# Patient Record
Sex: FEMALE | Race: WHITE | Employment: FULL TIME | URBAN - METROPOLITAN AREA
[De-identification: names, ages, dates, MRNs, and addresses within clinical notes are randomized per-mention and may not be internally consistent; named-entity substitution may affect disease eponyms.]

---

## 2019-08-21 ENCOUNTER — TELEPHONE (OUTPATIENT)
Dept: INTERNAL MEDICINE CLINIC | Facility: CLINIC | Age: 39
End: 2019-08-21

## 2019-08-21 NOTE — TELEPHONE ENCOUNTER
Phone call transferred from . Patient's  Missy Hathaway is on the phone. Per  he is trying to move up patient's appt. She is to be a new patient for Dr Jozef Quintero. She has an appt next week. Missy Rivas tells me he is worried about his wife.  She tabor

## 2019-08-29 ENCOUNTER — OFFICE VISIT (OUTPATIENT)
Dept: INTERNAL MEDICINE CLINIC | Facility: CLINIC | Age: 39
End: 2019-08-29
Payer: COMMERCIAL

## 2019-08-29 VITALS
HEART RATE: 76 BPM | DIASTOLIC BLOOD PRESSURE: 96 MMHG | TEMPERATURE: 99 F | OXYGEN SATURATION: 98 % | SYSTOLIC BLOOD PRESSURE: 160 MMHG | HEIGHT: 62 IN | BODY MASS INDEX: 31.1 KG/M2 | WEIGHT: 169 LBS

## 2019-08-29 DIAGNOSIS — M35.00 SJOGREN'S SYNDROME, WITH UNSPECIFIED ORGAN INVOLVEMENT (HCC): ICD-10-CM

## 2019-08-29 DIAGNOSIS — Z12.39 SCREENING FOR BREAST CANCER: ICD-10-CM

## 2019-08-29 DIAGNOSIS — I10 ESSENTIAL HYPERTENSION: ICD-10-CM

## 2019-08-29 DIAGNOSIS — Z00.00 ANNUAL PHYSICAL EXAM: Primary | ICD-10-CM

## 2019-08-29 PROCEDURE — 90471 IMMUNIZATION ADMIN: CPT | Performed by: INTERNAL MEDICINE

## 2019-08-29 PROCEDURE — 99385 PREV VISIT NEW AGE 18-39: CPT | Performed by: INTERNAL MEDICINE

## 2019-08-29 PROCEDURE — 90715 TDAP VACCINE 7 YRS/> IM: CPT | Performed by: INTERNAL MEDICINE

## 2019-08-29 RX ORDER — AMLODIPINE BESYLATE 2.5 MG/1
2.5 TABLET ORAL DAILY
Qty: 90 TABLET | Refills: 1 | Status: SHIPPED | OUTPATIENT
Start: 2019-08-29 | End: 2020-03-02

## 2019-08-29 NOTE — PROGRESS NOTES
Codi Mata is a 44year old female. Patient presents with:  Establish Care: Dx with sjrogen in 2004 and has not had Tx. hx bell's palsy.  she has not had a PCP in many years  Physical: last pap 2017 normal at [facility] per pt      HPI:   Codi Mata is a 44 year wheezing  BREAST: denies masses or nipple discharge  CARDIOVASCULAR: denies chest pain, pressure, or palpitations  GI: denies abdominal pain, nausea, vomiting, diarrhea, constipation, hematochezia, or melena  : denies dysuria, urinary frequency, vaginal

## 2019-08-31 ENCOUNTER — LAB ENCOUNTER (OUTPATIENT)
Dept: LAB | Age: 39
End: 2019-08-31
Attending: INTERNAL MEDICINE
Payer: COMMERCIAL

## 2019-08-31 DIAGNOSIS — Z00.00 ANNUAL PHYSICAL EXAM: ICD-10-CM

## 2019-08-31 LAB
ALBUMIN SERPL-MCNC: 3.8 G/DL (ref 3.4–5)
ALBUMIN/GLOB SERPL: 0.8 {RATIO} (ref 1–2)
ALP LIVER SERPL-CCNC: 91 U/L (ref 37–98)
ALT SERPL-CCNC: 21 U/L (ref 13–56)
ANION GAP SERPL CALC-SCNC: 7 MMOL/L (ref 0–18)
AST SERPL-CCNC: 15 U/L (ref 15–37)
BASOPHILS # BLD AUTO: 0.03 X10(3) UL (ref 0–0.2)
BASOPHILS NFR BLD AUTO: 0.5 %
BILIRUB SERPL-MCNC: 0.5 MG/DL (ref 0.1–2)
BUN BLD-MCNC: 13 MG/DL (ref 7–18)
BUN/CREAT SERPL: 21.3 (ref 10–20)
CALCIUM BLD-MCNC: 8.7 MG/DL (ref 8.5–10.1)
CHLORIDE SERPL-SCNC: 107 MMOL/L (ref 98–112)
CHOLEST SMN-MCNC: 113 MG/DL (ref ?–200)
CO2 SERPL-SCNC: 24 MMOL/L (ref 21–32)
CREAT BLD-MCNC: 0.61 MG/DL (ref 0.55–1.02)
DEPRECATED RDW RBC AUTO: 37.4 FL (ref 35.1–46.3)
EOSINOPHIL # BLD AUTO: 0.1 X10(3) UL (ref 0–0.7)
EOSINOPHIL NFR BLD AUTO: 1.8 %
ERYTHROCYTE [DISTWIDTH] IN BLOOD BY AUTOMATED COUNT: 11.8 % (ref 11–15)
GLOBULIN PLAS-MCNC: 4.5 G/DL (ref 2.8–4.4)
GLUCOSE BLD-MCNC: 89 MG/DL (ref 70–99)
HCT VFR BLD AUTO: 38 % (ref 35–48)
HDLC SERPL-MCNC: 33 MG/DL (ref 40–59)
HGB BLD-MCNC: 13 G/DL (ref 12–16)
IMM GRANULOCYTES # BLD AUTO: 0.01 X10(3) UL (ref 0–1)
IMM GRANULOCYTES NFR BLD: 0.2 %
LDLC SERPL CALC-MCNC: 66 MG/DL (ref ?–100)
LYMPHOCYTES # BLD AUTO: 1.8 X10(3) UL (ref 1–4)
LYMPHOCYTES NFR BLD AUTO: 32.3 %
M PROTEIN MFR SERPL ELPH: 8.3 G/DL (ref 6.4–8.2)
MCH RBC QN AUTO: 29.6 PG (ref 26–34)
MCHC RBC AUTO-ENTMCNC: 34.2 G/DL (ref 31–37)
MCV RBC AUTO: 86.6 FL (ref 80–100)
MONOCYTES # BLD AUTO: 0.5 X10(3) UL (ref 0.1–1)
MONOCYTES NFR BLD AUTO: 9 %
NEUTROPHILS # BLD AUTO: 3.13 X10 (3) UL (ref 1.5–7.7)
NEUTROPHILS # BLD AUTO: 3.13 X10(3) UL (ref 1.5–7.7)
NEUTROPHILS NFR BLD AUTO: 56.2 %
NONHDLC SERPL-MCNC: 80 MG/DL (ref ?–130)
OSMOLALITY SERPL CALC.SUM OF ELEC: 286 MOSM/KG (ref 275–295)
PATIENT FASTING: YES
PATIENT FASTING: YES
PLATELET # BLD AUTO: 301 10(3)UL (ref 150–450)
POTASSIUM SERPL-SCNC: 3.8 MMOL/L (ref 3.5–5.1)
RBC # BLD AUTO: 4.39 X10(6)UL (ref 3.8–5.3)
SODIUM SERPL-SCNC: 138 MMOL/L (ref 136–145)
TRIGL SERPL-MCNC: 72 MG/DL (ref 30–149)
TSI SER-ACNC: 1.82 MIU/ML (ref 0.36–3.74)
VLDLC SERPL CALC-MCNC: 14 MG/DL (ref 0–30)
WBC # BLD AUTO: 5.6 X10(3) UL (ref 4–11)

## 2019-08-31 PROCEDURE — 80053 COMPREHEN METABOLIC PANEL: CPT

## 2019-08-31 PROCEDURE — 80061 LIPID PANEL: CPT

## 2019-08-31 PROCEDURE — 84443 ASSAY THYROID STIM HORMONE: CPT

## 2019-08-31 PROCEDURE — 36415 COLL VENOUS BLD VENIPUNCTURE: CPT

## 2019-08-31 PROCEDURE — 85025 COMPLETE CBC W/AUTO DIFF WBC: CPT

## 2019-09-02 ENCOUNTER — TELEPHONE (OUTPATIENT)
Dept: INTERNAL MEDICINE CLINIC | Facility: CLINIC | Age: 39
End: 2019-09-02

## 2019-09-02 DIAGNOSIS — R77.1 ELEVATED SERUM GLOBULIN LEVEL: Primary | ICD-10-CM

## 2019-09-02 NOTE — TELEPHONE ENCOUNTER
Please notify patient that her labs were withiin the normal range in regards to electrolytes, kidney function liver function, cholesterol, fasting sugar.    One of her protein levels was elevated and before we get worried about this, I would like her to rep

## 2019-09-03 ENCOUNTER — APPOINTMENT (OUTPATIENT)
Dept: LAB | Age: 39
End: 2019-09-03
Attending: INTERNAL MEDICINE
Payer: COMMERCIAL

## 2019-09-03 DIAGNOSIS — R77.1 ELEVATED SERUM GLOBULIN LEVEL: ICD-10-CM

## 2019-09-03 LAB
ALBUMIN SERPL-MCNC: 3.7 G/DL (ref 3.4–5)
ALBUMIN/GLOB SERPL: 0.8 {RATIO} (ref 1–2)
ALP LIVER SERPL-CCNC: 90 U/L (ref 37–98)
ALT SERPL-CCNC: 18 U/L (ref 13–56)
ANION GAP SERPL CALC-SCNC: 4 MMOL/L (ref 0–18)
AST SERPL-CCNC: 13 U/L (ref 15–37)
BILIRUB SERPL-MCNC: 0.3 MG/DL (ref 0.1–2)
BUN BLD-MCNC: 13 MG/DL (ref 7–18)
BUN/CREAT SERPL: 23.2 (ref 10–20)
CALCIUM BLD-MCNC: 8.4 MG/DL (ref 8.5–10.1)
CHLORIDE SERPL-SCNC: 107 MMOL/L (ref 98–112)
CO2 SERPL-SCNC: 27 MMOL/L (ref 21–32)
CREAT BLD-MCNC: 0.56 MG/DL (ref 0.55–1.02)
GLOBULIN PLAS-MCNC: 4.7 G/DL (ref 2.8–4.4)
GLUCOSE BLD-MCNC: 91 MG/DL (ref 70–99)
M PROTEIN MFR SERPL ELPH: 8.4 G/DL (ref 6.4–8.2)
OSMOLALITY SERPL CALC.SUM OF ELEC: 286 MOSM/KG (ref 275–295)
PATIENT FASTING: NO
POTASSIUM SERPL-SCNC: 3.6 MMOL/L (ref 3.5–5.1)
SODIUM SERPL-SCNC: 138 MMOL/L (ref 136–145)

## 2019-09-03 PROCEDURE — 36415 COLL VENOUS BLD VENIPUNCTURE: CPT

## 2019-09-03 PROCEDURE — 80053 COMPREHEN METABOLIC PANEL: CPT

## 2019-09-13 ENCOUNTER — TELEPHONE (OUTPATIENT)
Dept: INTERNAL MEDICINE CLINIC | Facility: CLINIC | Age: 39
End: 2019-09-13

## 2019-09-16 ENCOUNTER — LAB ENCOUNTER (OUTPATIENT)
Dept: LAB | Age: 39
End: 2019-09-16
Attending: INTERNAL MEDICINE
Payer: COMMERCIAL

## 2019-09-16 ENCOUNTER — TELEPHONE (OUTPATIENT)
Dept: INTERNAL MEDICINE CLINIC | Facility: CLINIC | Age: 39
End: 2019-09-16

## 2019-09-16 DIAGNOSIS — D89.2 HYPERGAMMAGLOBULINEMIA: Primary | ICD-10-CM

## 2019-09-16 DIAGNOSIS — D89.2 HYPERGAMMAGLOBULINEMIA: ICD-10-CM

## 2019-09-16 LAB
BILIRUB UR QL: NEGATIVE
CLARITY UR: CLEAR
COLOR UR: YELLOW
GLUCOSE UR-MCNC: NEGATIVE MG/DL
HGB UR QL STRIP.AUTO: NEGATIVE
KETONES UR-MCNC: NEGATIVE MG/DL
LEUKOCYTE ESTERASE UR QL STRIP.AUTO: NEGATIVE
NITRITE UR QL STRIP.AUTO: NEGATIVE
PH UR: 6 [PH] (ref 5–8)
PROT UR-MCNC: NEGATIVE MG/DL
SP GR UR STRIP: 1.02 (ref 1–1.03)
UROBILINOGEN UR STRIP-ACNC: <2

## 2019-09-16 PROCEDURE — 86039 ANTINUCLEAR ANTIBODIES (ANA): CPT

## 2019-09-16 PROCEDURE — 86038 ANTINUCLEAR ANTIBODIES: CPT

## 2019-09-16 PROCEDURE — 84165 PROTEIN E-PHORESIS SERUM: CPT

## 2019-09-16 PROCEDURE — 86334 IMMUNOFIX E-PHORESIS SERUM: CPT

## 2019-09-16 PROCEDURE — 83883 ASSAY NEPHELOMETRY NOT SPEC: CPT

## 2019-09-16 PROCEDURE — 81003 URINALYSIS AUTO W/O SCOPE: CPT

## 2019-09-16 PROCEDURE — 82595 ASSAY OF CRYOGLOBULIN: CPT

## 2019-09-16 PROCEDURE — 86335 IMMUNFIX E-PHORSIS/URINE/CSF: CPT

## 2019-09-16 PROCEDURE — 84156 ASSAY OF PROTEIN URINE: CPT

## 2019-09-16 PROCEDURE — 36415 COLL VENOUS BLD VENIPUNCTURE: CPT

## 2019-09-16 NOTE — TELEPHONE ENCOUNTER
Please call patient; her protein levels are still slightly elevated. I would recommend doing additional testing to evaluate this further.  I have placed orders; she does not need to fast.     This elevated protein may not be of significance but as it is sli

## 2019-09-19 LAB — NUCLEAR IGG TITR SER IF: POSITIVE {TITER}

## 2019-09-20 LAB
ALBUMIN SERPL ELPH-MCNC: 3.99 G/DL (ref 3.75–5.21)
ALBUMIN/GLOB SERPL: 1.05 {RATIO} (ref 1–2)
ALPHA1 GLOB SERPL ELPH-MCNC: 0.26 G/DL (ref 0.19–0.46)
ALPHA2 GLOB SERPL ELPH-MCNC: 0.58 G/DL (ref 0.48–1.05)
ANA NUCLEOLAR TITR SER IF: 640 {TITER}
B-GLOBULIN SERPL ELPH-MCNC: 0.9 G/DL (ref 0.68–1.23)
GAMMA GLOB SERPL ELPH-MCNC: 2.07 G/DL (ref 0.62–1.7)
TOTAL PROTEIN (SPECIAL TESTING): 7.8 G/DL (ref 6.5–9.1)

## 2019-09-25 ENCOUNTER — TELEPHONE (OUTPATIENT)
Dept: INTERNAL MEDICINE CLINIC | Facility: CLINIC | Age: 39
End: 2019-09-25

## 2019-09-25 NOTE — TELEPHONE ENCOUNTER
Patient says she would like Dr. Sid Prater recommendation for a rheumatologist and adds that she has [employer]. I recommended she call Ayad to see who is in her network so referral is covered.  She will call back after she speaks with her insurance

## 2019-10-07 ENCOUNTER — PATIENT MESSAGE (OUTPATIENT)
Dept: INTERNAL MEDICINE CLINIC | Facility: CLINIC | Age: 39
End: 2019-10-07

## 2019-10-07 DIAGNOSIS — M35.00 SJOGREN'S SYNDROME, WITH UNSPECIFIED ORGAN INVOLVEMENT (HCC): Primary | ICD-10-CM

## 2019-10-07 NOTE — TELEPHONE ENCOUNTER
From: Darl Lesch  To: Bobby Obrien DO  Sent: 10/7/2019 11:42 AM CDT  Subject: Referral Request    Hello, I did call to schedule an appoinment with Dr. Eduardo Truong as instructed, however she is on Maternity Leave, so I was scheduled to see Dr Sandi Verma this Olinda Jorge

## 2019-10-09 ENCOUNTER — OFFICE VISIT (OUTPATIENT)
Dept: RHEUMATOLOGY | Facility: CLINIC | Age: 39
End: 2019-10-09
Payer: COMMERCIAL

## 2019-10-09 ENCOUNTER — APPOINTMENT (OUTPATIENT)
Dept: LAB | Facility: HOSPITAL | Age: 39
End: 2019-10-09
Attending: INTERNAL MEDICINE
Payer: COMMERCIAL

## 2019-10-09 VITALS
SYSTOLIC BLOOD PRESSURE: 131 MMHG | HEIGHT: 62 IN | DIASTOLIC BLOOD PRESSURE: 84 MMHG | BODY MASS INDEX: 30.55 KG/M2 | HEART RATE: 66 BPM | RESPIRATION RATE: 16 BRPM | WEIGHT: 166 LBS

## 2019-10-09 DIAGNOSIS — R76.8 POSITIVE ANA (ANTINUCLEAR ANTIBODY): ICD-10-CM

## 2019-10-09 DIAGNOSIS — G62.9 NEUROPATHY: ICD-10-CM

## 2019-10-09 DIAGNOSIS — M35.01 SJOGREN'S SYNDROME WITH KERATOCONJUNCTIVITIS SICCA (HCC): Primary | ICD-10-CM

## 2019-10-09 DIAGNOSIS — M35.01 SJOGREN'S SYNDROME WITH KERATOCONJUNCTIVITIS SICCA (HCC): ICD-10-CM

## 2019-10-09 PROCEDURE — 81001 URINALYSIS AUTO W/SCOPE: CPT | Performed by: INTERNAL MEDICINE

## 2019-10-09 PROCEDURE — 85730 THROMBOPLASTIN TIME PARTIAL: CPT

## 2019-10-09 PROCEDURE — 83516 IMMUNOASSAY NONANTIBODY: CPT | Performed by: INTERNAL MEDICINE

## 2019-10-09 PROCEDURE — 86225 DNA ANTIBODY NATIVE: CPT

## 2019-10-09 PROCEDURE — 99244 OFF/OP CNSLTJ NEW/EST MOD 40: CPT | Performed by: INTERNAL MEDICINE

## 2019-10-09 PROCEDURE — 85652 RBC SED RATE AUTOMATED: CPT | Performed by: INTERNAL MEDICINE

## 2019-10-09 PROCEDURE — 86200 CCP ANTIBODY: CPT | Performed by: INTERNAL MEDICINE

## 2019-10-09 PROCEDURE — 86146 BETA-2 GLYCOPROTEIN ANTIBODY: CPT | Performed by: INTERNAL MEDICINE

## 2019-10-09 PROCEDURE — 86431 RHEUMATOID FACTOR QUANT: CPT | Performed by: INTERNAL MEDICINE

## 2019-10-09 PROCEDURE — 86160 COMPLEMENT ANTIGEN: CPT | Performed by: INTERNAL MEDICINE

## 2019-10-09 PROCEDURE — 36415 COLL VENOUS BLD VENIPUNCTURE: CPT

## 2019-10-09 PROCEDURE — 85613 RUSSELL VIPER VENOM DILUTED: CPT

## 2019-10-09 PROCEDURE — 86140 C-REACTIVE PROTEIN: CPT | Performed by: INTERNAL MEDICINE

## 2019-10-09 PROCEDURE — 86147 CARDIOLIPIN ANTIBODY EA IG: CPT

## 2019-10-09 PROCEDURE — 86235 NUCLEAR ANTIGEN ANTIBODY: CPT

## 2019-10-09 PROCEDURE — 85390 FIBRINOLYSINS SCREEN I&R: CPT

## 2019-10-09 PROCEDURE — 85598 HEXAGNAL PHOSPH PLTLT NEUTRL: CPT

## 2019-10-09 PROCEDURE — 85610 PROTHROMBIN TIME: CPT

## 2019-10-09 NOTE — PROGRESS NOTES
Sharon Jensen is a 44year old female who presents for Patient presents with:  Consult: pt c/o pain on back, ankles, and fingers  Sjogren's Syndrome  Abnormal Labs  .    HPI:   CC: hx of Sjogren  Consult: referred by PCP Dr. Asad Victoria    This is a 45 yo F with rece Date   • D & C  1997, 1998   • TONSILLECTOMY      1996   • TUBAL LIGATION  2013      Family History   Problem Relation Age of Onset   • Diabetes Father    • Alcohol and Other Disorders Associated Father    • Asthma Mother    • Depression Mother    • Diabet (75.3 kg)   BMI 30.36 kg/m²   GEN: AAOx3, NAD  HEENT: EOMI, PERRLA, no injection or icterus, oral mucosa moist, no oral lesions. No lymphadenopathy. No facial rash  CVS: RRR, no murmurs rubs or gallops. Equal 2+ distal pulses.    LUNGS: CTAB, no increased w ALPHA-2-GLOBULINS      0.48 - 1.05 g/dL    BETA GLOBULINS      0.68 - 1.23 g/dL    GAMMA GLOBULINS      0.62 - 1.70 g/dL    ALBUMIN/GLOBULIN RATIO      1.00 - 2.00    SPE INTERPRETATION          Reviewed By:       Lenin Moran M.D.    IMMUNOFIXATION

## 2019-10-09 NOTE — PATIENT INSTRUCTIONS
You were seen today for sjogren  Lets get more blood work  Will then decide on what to start from there  For dry mouth, can use biotene over the counter

## 2019-10-11 ENCOUNTER — PATIENT MESSAGE (OUTPATIENT)
Dept: RHEUMATOLOGY | Facility: CLINIC | Age: 39
End: 2019-10-11

## 2019-10-11 NOTE — TELEPHONE ENCOUNTER
From: Yaquelin Solis  To: Sharon Lester MD  Sent: 10/11/2019 1:09 PM CDT  Subject: Other    Hi Dr Berenice Nguyen,  I don't remember mentioning to you during my office visit that I have constant back pain.  I'm not sure if any of the lab test that were ordered would show

## 2019-10-22 ENCOUNTER — TELEPHONE (OUTPATIENT)
Dept: INTERNAL MEDICINE CLINIC | Facility: CLINIC | Age: 39
End: 2019-10-22

## 2019-10-22 NOTE — TELEPHONE ENCOUNTER
To Dr. Yani Peres---    Scheduled pt for 11/11    BP's:     120's/70's in the evening  130's/90's during the day     Pt reports she takes her medication at night.

## 2019-10-23 ENCOUNTER — PATIENT MESSAGE (OUTPATIENT)
Dept: RHEUMATOLOGY | Facility: CLINIC | Age: 39
End: 2019-10-23

## 2019-10-23 NOTE — TELEPHONE ENCOUNTER
Please see below. Discuss plan of care/possible medication during upcoming office visit? Currently scheduled on 11/27 - should pt schedule sooner appt? Please advise.

## 2019-10-23 NOTE — TELEPHONE ENCOUNTER
From: Bella Guerra  To:  Dariana Plascencia MD  Sent: 10/23/2019 4:10 PM CDT  Subject: Non-Urgent Medical Question    Hi Dr Debby Osuna, I saw the details you wrote regarding my test results and I am wondering if I will be given any prescription for Sjogrens or will we [facility]

## 2019-10-25 NOTE — TELEPHONE ENCOUNTER
If you can let her know that the treatment for Sjogren's is mostly conservative treatment. Now if she has joint pain from the Sjogren's we usually add Plaquenil. I know she was on this in the past but we can start this again to see if it helps.     Dr. Vita Plaza

## 2019-10-28 RX ORDER — HYDROXYCHLOROQUINE SULFATE 200 MG/1
400 TABLET, FILM COATED ORAL DAILY
Qty: 60 TABLET | Refills: 2 | Status: SHIPPED | OUTPATIENT
Start: 2019-10-28 | End: 2020-02-26

## 2019-10-28 NOTE — TELEPHONE ENCOUNTER
Just put the prescription into her pharmacy.  She will also need to see opthalmology after 3 months of starting HCQ and it can cause blurry vision

## 2019-11-11 ENCOUNTER — OFFICE VISIT (OUTPATIENT)
Dept: INTERNAL MEDICINE CLINIC | Facility: CLINIC | Age: 39
End: 2019-11-11
Payer: COMMERCIAL

## 2019-11-11 VITALS
SYSTOLIC BLOOD PRESSURE: 132 MMHG | WEIGHT: 166.63 LBS | OXYGEN SATURATION: 98 % | TEMPERATURE: 99 F | HEART RATE: 84 BPM | DIASTOLIC BLOOD PRESSURE: 88 MMHG | BODY MASS INDEX: 30 KG/M2

## 2019-11-11 DIAGNOSIS — Z23 NEED FOR INFLUENZA VACCINATION: Primary | ICD-10-CM

## 2019-11-11 DIAGNOSIS — G89.29 CHRONIC BILATERAL THORACIC BACK PAIN: ICD-10-CM

## 2019-11-11 DIAGNOSIS — M54.6 CHRONIC BILATERAL THORACIC BACK PAIN: ICD-10-CM

## 2019-11-11 DIAGNOSIS — Z12.4 SCREENING FOR CERVICAL CANCER: ICD-10-CM

## 2019-11-11 PROCEDURE — 90686 IIV4 VACC NO PRSV 0.5 ML IM: CPT | Performed by: INTERNAL MEDICINE

## 2019-11-11 PROCEDURE — 90471 IMMUNIZATION ADMIN: CPT | Performed by: INTERNAL MEDICINE

## 2019-11-11 PROCEDURE — 99214 OFFICE O/P EST MOD 30 MIN: CPT | Performed by: INTERNAL MEDICINE

## 2019-11-12 NOTE — PROGRESS NOTES
Cherie Aldana is a 44year old female. Patient presents with:  Pap: Pt present for pap smear, last done in 2015. Back Pain: Pt c/o 7/10 back pain. Pt sits at work and c/o pain constant aching pain.        HPI:   Cherie Aldana is a 44year old female who presents smoker in teenage years    Alcohol use: Not Currently      Frequency: Never    Drug use: Never         REVIEW OF SYSTEMS:   GENERAL: feels well otherwise  MSK: reports back pain; denies weakness, numbness, tingling.      EXAM:   /88 (BP Location: [place]

## 2019-11-27 ENCOUNTER — OFFICE VISIT (OUTPATIENT)
Dept: RHEUMATOLOGY | Facility: CLINIC | Age: 39
End: 2019-11-27
Payer: COMMERCIAL

## 2019-11-27 VITALS
HEIGHT: 62 IN | SYSTOLIC BLOOD PRESSURE: 137 MMHG | RESPIRATION RATE: 16 BRPM | WEIGHT: 163 LBS | DIASTOLIC BLOOD PRESSURE: 86 MMHG | BODY MASS INDEX: 30 KG/M2 | HEART RATE: 88 BPM

## 2019-11-27 DIAGNOSIS — Z51.81 THERAPEUTIC DRUG MONITORING: ICD-10-CM

## 2019-11-27 DIAGNOSIS — R76.8 POSITIVE ANA (ANTINUCLEAR ANTIBODY): ICD-10-CM

## 2019-11-27 DIAGNOSIS — M35.01 SJOGREN'S SYNDROME WITH KERATOCONJUNCTIVITIS SICCA (HCC): Primary | ICD-10-CM

## 2019-11-27 PROCEDURE — 99214 OFFICE O/P EST MOD 30 MIN: CPT | Performed by: INTERNAL MEDICINE

## 2019-11-27 RX ORDER — MELOXICAM 15 MG/1
15 TABLET ORAL DAILY
Qty: 30 TABLET | Refills: 0 | Status: SHIPPED | OUTPATIENT
Start: 2019-11-27 | End: 2020-03-02

## 2019-11-27 NOTE — PATIENT INSTRUCTIONS
You were seen today for sjogren  Cont hcq 200 mg twice a day  Make an appt with opthomologist  Get blood work in 2 mos, end [employer] mobic 15 mg daily as needed  If it doesn't work can get xray of the neck

## 2019-11-27 NOTE — PROGRESS NOTES
Wilder Blackwell is a 44year old female. HPI:   Patient presents with:  Sjogren's Syndrome: pt c/o back and shoulder pain  Medication Follow-Up      I had the pleasure of seeing Wilder Blackwell on 11/27/2019 for follow up Sjogren syndrome.      Current Medications visit):  Current Outpatient Medications   Medication Sig Dispense Refill   • Hydroxychloroquine Sulfate 200 MG Oral Tab Take 2 tablets (400 mg total) by mouth daily.  60 tablet 2   • amLODIPine Besylate 2.5 MG Oral Tab Take 1 tablet (2.5 mg total) by mouth 70 - 99 mg/dL 91    Sodium      136 - 145 mmol/L 138    Potassium      3.5 - 5.1 mmol/L 3.6    Chloride      98 - 112 mmol/L 107    Carbon Dioxide, Total      21.0 - 32.0 mmol/L 27.0    ANION GAP      0 - 18 mmol/L 4    BUN      7 - 18 mg/dL 13    CREATIN Cardiolipin IgM Antibody      0.0 - 12.4 MPL 5.6   MARLENE SCREEN      Negative    C-REACTIVE PROTEIN      <0.30 mg/dL <0.29   C-Citrullinated Peptide IgG AB      0.0 - 6.9 U/mL 1.9   SED RATE      0 - 20 mm/Hr 25 (H)   RHEUMATOID FACTOR      <15 IU/mL <10 10.0 - 40.0 mg/dL    COMPLEMENT C3      90.0 - 180.0 mg/dL    Centromere Ab, IgG      0 - 40 AU/mL    Anti-Nunez/RNP Antibody      Negative    Anti-Smith Antibody      Negative    Anti-Sjogren's A      Negative    Anti-Sjogren's B      Negative    Scler

## 2020-02-26 ENCOUNTER — OFFICE VISIT (OUTPATIENT)
Dept: RHEUMATOLOGY | Facility: CLINIC | Age: 40
End: 2020-02-26
Payer: COMMERCIAL

## 2020-02-26 ENCOUNTER — PATIENT MESSAGE (OUTPATIENT)
Dept: INTERNAL MEDICINE CLINIC | Facility: CLINIC | Age: 40
End: 2020-02-26

## 2020-02-26 ENCOUNTER — LAB ENCOUNTER (OUTPATIENT)
Dept: LAB | Age: 40
End: 2020-02-26
Attending: INTERNAL MEDICINE
Payer: COMMERCIAL

## 2020-02-26 VITALS
HEIGHT: 62 IN | WEIGHT: 160 LBS | SYSTOLIC BLOOD PRESSURE: 126 MMHG | DIASTOLIC BLOOD PRESSURE: 81 MMHG | HEART RATE: 71 BPM | BODY MASS INDEX: 29.44 KG/M2

## 2020-02-26 DIAGNOSIS — M54.6 CHRONIC BILATERAL THORACIC BACK PAIN: ICD-10-CM

## 2020-02-26 DIAGNOSIS — Z51.81 THERAPEUTIC DRUG MONITORING: ICD-10-CM

## 2020-02-26 DIAGNOSIS — R76.8 POSITIVE ANA (ANTINUCLEAR ANTIBODY): ICD-10-CM

## 2020-02-26 DIAGNOSIS — G89.29 CHRONIC BILATERAL THORACIC BACK PAIN: ICD-10-CM

## 2020-02-26 DIAGNOSIS — M35.01 SJOGREN'S SYNDROME WITH KERATOCONJUNCTIVITIS SICCA (HCC): ICD-10-CM

## 2020-02-26 DIAGNOSIS — M35.01 SJOGREN'S SYNDROME WITH KERATOCONJUNCTIVITIS SICCA (HCC): Primary | ICD-10-CM

## 2020-02-26 LAB
ALBUMIN SERPL-MCNC: 3.9 G/DL (ref 3.4–5)
ALT SERPL-CCNC: 19 U/L (ref 13–56)
AST SERPL-CCNC: 13 U/L (ref 15–37)
BASOPHILS # BLD AUTO: 0.03 X10(3) UL (ref 0–0.2)
BASOPHILS NFR BLD AUTO: 0.5 %
CREAT BLD-MCNC: 0.62 MG/DL (ref 0.55–1.02)
CRP SERPL-MCNC: <0.29 MG/DL (ref ?–0.3)
DEPRECATED RDW RBC AUTO: 35.7 FL (ref 35.1–46.3)
EOSINOPHIL # BLD AUTO: 0.05 X10(3) UL (ref 0–0.7)
EOSINOPHIL NFR BLD AUTO: 0.8 %
ERYTHROCYTE [DISTWIDTH] IN BLOOD BY AUTOMATED COUNT: 11.6 % (ref 11–15)
ERYTHROCYTE [SEDIMENTATION RATE] IN BLOOD: 21 MM/HR (ref 0–20)
HCT VFR BLD AUTO: 36.7 % (ref 35–48)
HGB BLD-MCNC: 13 G/DL (ref 12–16)
IMM GRANULOCYTES # BLD AUTO: 0 X10(3) UL (ref 0–1)
IMM GRANULOCYTES NFR BLD: 0 %
LYMPHOCYTES # BLD AUTO: 2.19 X10(3) UL (ref 1–4)
LYMPHOCYTES NFR BLD AUTO: 35.2 %
MCH RBC QN AUTO: 30.2 PG (ref 26–34)
MCHC RBC AUTO-ENTMCNC: 35.4 G/DL (ref 31–37)
MCV RBC AUTO: 85.3 FL (ref 80–100)
MONOCYTES # BLD AUTO: 0.49 X10(3) UL (ref 0.1–1)
MONOCYTES NFR BLD AUTO: 7.9 %
NEUTROPHILS # BLD AUTO: 3.47 X10 (3) UL (ref 1.5–7.7)
NEUTROPHILS # BLD AUTO: 3.47 X10(3) UL (ref 1.5–7.7)
NEUTROPHILS NFR BLD AUTO: 55.6 %
PLATELET # BLD AUTO: 274 10(3)UL (ref 150–450)
RBC # BLD AUTO: 4.3 X10(6)UL (ref 3.8–5.3)
WBC # BLD AUTO: 6.2 X10(3) UL (ref 4–11)

## 2020-02-26 PROCEDURE — 84450 TRANSFERASE (AST) (SGOT): CPT

## 2020-02-26 PROCEDURE — 82565 ASSAY OF CREATININE: CPT

## 2020-02-26 PROCEDURE — 36415 COLL VENOUS BLD VENIPUNCTURE: CPT

## 2020-02-26 PROCEDURE — 82040 ASSAY OF SERUM ALBUMIN: CPT

## 2020-02-26 PROCEDURE — 99214 OFFICE O/P EST MOD 30 MIN: CPT | Performed by: INTERNAL MEDICINE

## 2020-02-26 PROCEDURE — 86140 C-REACTIVE PROTEIN: CPT

## 2020-02-26 PROCEDURE — 84460 ALANINE AMINO (ALT) (SGPT): CPT

## 2020-02-26 PROCEDURE — 85652 RBC SED RATE AUTOMATED: CPT

## 2020-02-26 PROCEDURE — 85025 COMPLETE CBC W/AUTO DIFF WBC: CPT

## 2020-02-26 RX ORDER — HYDROXYCHLOROQUINE SULFATE 200 MG/1
400 TABLET, FILM COATED ORAL DAILY
Qty: 60 TABLET | Refills: 2 | Status: SHIPPED | OUTPATIENT
Start: 2020-02-26 | End: 2020-06-24

## 2020-02-26 RX ORDER — CELECOXIB 100 MG/1
100 CAPSULE ORAL DAILY
Qty: 30 CAPSULE | Refills: 1 | Status: SHIPPED | OUTPATIENT
Start: 2020-02-26 | End: 2020-06-04

## 2020-02-26 NOTE — PATIENT INSTRUCTIONS
You were seen today for Sjogren  Blood work looked good  Start PT for your back  Start Celebrex for joint pain  Refilled HCQ

## 2020-02-26 NOTE — PROGRESS NOTES
Mary Manzo is a 44year old female. HPI:   Patient presents with: Follow - Up  Hand Pain: swelling, stiffness  Back Pain  Shoulder Pain  Test Results      I had the pleasure of seeing Mary Manzo on 2/26/2020 for follow up Sjogren syndrome.      Current Dispense Refill   • Meloxicam 15 MG Oral Tab Take 1 tablet (15 mg total) by mouth daily. 30 tablet 0   • Hydroxychloroquine Sulfate 200 MG Oral Tab Take 2 tablets (400 mg total) by mouth daily.  60 tablet 2   • amLODIPine Besylate 2.5 MG Oral Tab Take 1 tab Granulocyte %      % 0.0   CREATININE      0.55 - 1.02 mg/dL 0.62   eGFR NON-AFR.  AMERICAN      >=60 114   eGFR       >=60 131   Albumin      3.4 - 5.0 g/dL 3.9   ALT (SGPT)      13 - 56 U/L 19   AST (SGOT)      15 - 37 U/L 13 (L)   C-REACT Negative Strong Positive (A)   Anti-Sjogren's B      Negative Moderate Positive (A)   Scleroderma (Scl-70) (MONTY) Antibody, IgG      0 - 40 AU/mL 3   Anti Double Strand DNA      <10 <10     Component      Latest Ref Rng & Units 9/16/2019   Color Urine symptoms)-     Sjogren's +MARLENE, SSA, SSB, smith, +sicca symptoms)  - Continue Plaquenil 200 mg twice a day about 1 month ago  - she is to have joint pain, dry eyes and dry mouth  - has not seen ophthalmology yet, advised that she will need to see them as sh

## 2020-02-27 ENCOUNTER — TELEPHONE (OUTPATIENT)
Dept: RHEUMATOLOGY | Facility: CLINIC | Age: 40
End: 2020-02-27

## 2020-02-27 NOTE — TELEPHONE ENCOUNTER
PA approved through 2/26/2021. [id number] [facility] made aware. Pt will be notified by pharmacy when medication is ready for .

## 2020-02-27 NOTE — TELEPHONE ENCOUNTER
Pt informed Dr. Fredrick Alvarado that per billing, a modifier needs to be entered for one of her labs from 10/9/2019. Spoke with Verena Lemus in the business office who stated pt's account has a \"pending with insurance\" claim. He will follow up with pt and contact rheum office if any additional assistance is needed.

## 2020-03-02 ENCOUNTER — OFFICE VISIT (OUTPATIENT)
Dept: INTERNAL MEDICINE CLINIC | Facility: CLINIC | Age: 40
End: 2020-03-02
Payer: COMMERCIAL

## 2020-03-02 VITALS
SYSTOLIC BLOOD PRESSURE: 124 MMHG | HEIGHT: 62 IN | WEIGHT: 162 LBS | DIASTOLIC BLOOD PRESSURE: 88 MMHG | OXYGEN SATURATION: 99 % | TEMPERATURE: 98 F | BODY MASS INDEX: 29.81 KG/M2 | HEART RATE: 72 BPM

## 2020-03-02 DIAGNOSIS — L91.8 SKIN TAG: Primary | ICD-10-CM

## 2020-03-02 PROCEDURE — 99213 OFFICE O/P EST LOW 20 MIN: CPT | Performed by: INTERNAL MEDICINE

## 2020-03-02 RX ORDER — AMLODIPINE BESYLATE 2.5 MG/1
2.5 TABLET ORAL DAILY
Qty: 90 TABLET | Refills: 3 | Status: SHIPPED | OUTPATIENT
Start: 2020-03-02 | End: 2021-03-01

## 2020-03-02 NOTE — TELEPHONE ENCOUNTER
Spoke to patient. Explained that Deana Flaherty from [facility] will call [facility] insurance to find what is needed to get claim paid. Instructed patient to call if she needs any other assistance from us.

## 2020-03-02 NOTE — TELEPHONE ENCOUNTER
Spoke to [place] at [address] [facility]. Explained that referring provider would not know what modifier is needed for claims. Physician is not writing letter regarding modifier. Billing department should know the correct modifiers to use on claim.   Diana

## 2020-03-02 NOTE — PROGRESS NOTES
David Dong is a 44year old female. Patient presents with:  Checkup  Medication Request      HPI:   David Dong is a 44year old female who presents for a follow up visit    Is overall feeling well  Concerned about some skin tags   Needs refills on bp medica Tobacco comment: former social smoker in teenage years    Alcohol use: Not Currently      Frequency: Never    Drug use: Never         REVIEW OF SYSTEMS:   GENERAL: feels well otherwise  SKIN: notes skin tags      EXAM:   /88 (BP Location: Right arm

## 2020-05-26 DIAGNOSIS — M54.6 CHRONIC BILATERAL THORACIC BACK PAIN: ICD-10-CM

## 2020-05-26 DIAGNOSIS — G89.29 CHRONIC BILATERAL THORACIC BACK PAIN: ICD-10-CM

## 2020-05-26 NOTE — TELEPHONE ENCOUNTER
Requested Prescriptions     Pending Prescriptions Disp Refills   • celecoxib 100 MG Oral Cap 30 capsule 1     Sig: Take 1 capsule (100 mg total) by mouth daily.    note: medication not available, suggested alternative medication (naproxen) or generic     La    Sjogren's +MARLENE, SSA, SSB, smith, +sicca symptoms)  - Continue Plaquenil 200 mg twice a day about 1 month ago  - she is to have joint pain, dry eyes and dry mouth  - has not seen ophthalmology yet, advised that she will need to see them as she is on Pl

## 2020-06-04 RX ORDER — CELECOXIB 100 MG/1
100 CAPSULE ORAL DAILY
Qty: 30 CAPSULE | Refills: 1 | Status: SHIPPED | OUTPATIENT
Start: 2020-06-04 | End: 2020-11-02

## 2020-06-24 ENCOUNTER — OFFICE VISIT (OUTPATIENT)
Dept: RHEUMATOLOGY | Facility: CLINIC | Age: 40
End: 2020-06-24
Payer: COMMERCIAL

## 2020-06-24 VITALS
SYSTOLIC BLOOD PRESSURE: 127 MMHG | HEIGHT: 62 IN | HEART RATE: 80 BPM | BODY MASS INDEX: 29.93 KG/M2 | DIASTOLIC BLOOD PRESSURE: 90 MMHG | WEIGHT: 162.63 LBS

## 2020-06-24 DIAGNOSIS — M35.01 SJOGREN'S SYNDROME WITH KERATOCONJUNCTIVITIS SICCA (HCC): Primary | ICD-10-CM

## 2020-06-24 DIAGNOSIS — M54.50 ACUTE BILATERAL LOW BACK PAIN WITHOUT SCIATICA: ICD-10-CM

## 2020-06-24 DIAGNOSIS — Z51.81 THERAPEUTIC DRUG MONITORING: ICD-10-CM

## 2020-06-24 PROCEDURE — 99214 OFFICE O/P EST MOD 30 MIN: CPT | Performed by: INTERNAL MEDICINE

## 2020-06-24 RX ORDER — METHYLPREDNISOLONE 4 MG/1
TABLET ORAL
Qty: 1 PACKAGE | Refills: 0 | Status: SHIPPED | OUTPATIENT
Start: 2020-06-24 | End: 2020-09-21 | Stop reason: ALTCHOICE

## 2020-06-24 RX ORDER — HYDROXYCHLOROQUINE SULFATE 200 MG/1
400 TABLET, FILM COATED ORAL DAILY
Qty: 60 TABLET | Refills: 2 | Status: SHIPPED | OUTPATIENT
Start: 2020-06-24 | End: 2020-10-14

## 2020-06-24 NOTE — PROGRESS NOTES
Cherie Aldana is a 44year old female. HPI:   Patient presents with:  Sjogren's Syndrome  Back Pain: severe back x 4 days       I had the pleasure of seeing Cherie Aldana on 6/24/2020 for follow up Sjogren syndrome.      Current Medications:   mg bid- s celecoxib 100 MG Oral Cap Take 1 capsule (100 mg total) by mouth daily. 30 capsule 1   • amLODIPine Besylate 2.5 MG Oral Tab Take 1 tablet (2.5 mg total) by mouth daily.  90 tablet 3   • Hydroxychloroquine Sulfate 200 MG Oral Tab Take 2 tablets (400 mg tota Immature Granulocyte %      % 0.0   CREATININE      0.55 - 1.02 mg/dL 0.62   eGFR NON-AFR.  AMERICAN      >=60 114   eGFR       >=60 131   Albumin      3.4 - 5.0 g/dL 3.9   ALT (SGPT)      13 - 56 U/L 19   AST (SGOT)      15 - 37 U/L 13 (L Anti-Sjogren's A      Negative Strong Positive (A)   Anti-Sjogren's B      Negative Moderate Positive (A)   Scleroderma (Scl-70) (MONTY) Antibody, IgG      0 - 40 AU/mL 3   Anti Double Strand DNA      <10 <10     Component      Latest Ref Rng & Units 9/16/ SSB, harrell, +sicca symptoms)-     Sjogren's +MARLENE, SSA, SSB, smith, +sicca symptoms)  - Continue Plaquenil 200 mg twice a day about 1 month ago  - she has joint pain, dry eyes and dry mouth  - has not seen ophthalmology yet, advised that she will need to se

## 2020-06-24 NOTE — PATIENT INSTRUCTIONS
You were seen today for Sjogren  Seems stable  Cont  mg daily  Make an appt for eye doctor  Try the medrol dose pack for the back pain, if you cont to have it may need XR and Physical therapy

## 2020-07-07 ENCOUNTER — PATIENT MESSAGE (OUTPATIENT)
Dept: INTERNAL MEDICINE CLINIC | Facility: CLINIC | Age: 40
End: 2020-07-07

## 2020-07-07 ENCOUNTER — TELEPHONE (OUTPATIENT)
Dept: INTERNAL MEDICINE CLINIC | Facility: CLINIC | Age: 40
End: 2020-07-07

## 2020-07-07 ENCOUNTER — APPOINTMENT (OUTPATIENT)
Dept: LAB | Age: 40
End: 2020-07-07
Attending: INTERNAL MEDICINE
Payer: COMMERCIAL

## 2020-07-07 DIAGNOSIS — R30.0 DYSURIA: Primary | ICD-10-CM

## 2020-07-07 DIAGNOSIS — R31.29 MICROSCOPIC HEMATURIA: ICD-10-CM

## 2020-07-07 LAB
BILIRUB UR QL: NEGATIVE
COLOR UR: YELLOW
GLUCOSE UR-MCNC: NEGATIVE MG/DL
KETONES UR-MCNC: NEGATIVE MG/DL
NITRITE UR QL STRIP.AUTO: POSITIVE
PH UR: 7 [PH] (ref 5–8)
PROT UR-MCNC: 100 MG/DL
RBC #/AREA URNS AUTO: 623 /HPF
SP GR UR STRIP: 1.02 (ref 1–1.03)
UROBILINOGEN UR STRIP-ACNC: <2
WBC #/AREA URNS AUTO: 217 /HPF

## 2020-07-07 PROCEDURE — 81001 URINALYSIS AUTO W/SCOPE: CPT | Performed by: INTERNAL MEDICINE

## 2020-07-07 PROCEDURE — 87186 SC STD MICRODIL/AGAR DIL: CPT | Performed by: INTERNAL MEDICINE

## 2020-07-07 PROCEDURE — 87086 URINE CULTURE/COLONY COUNT: CPT | Performed by: INTERNAL MEDICINE

## 2020-07-07 PROCEDURE — 87088 URINE BACTERIA CULTURE: CPT | Performed by: INTERNAL MEDICINE

## 2020-07-07 RX ORDER — CEPHALEXIN 500 MG/1
500 CAPSULE ORAL 2 TIMES DAILY
Qty: 10 CAPSULE | Refills: 0 | Status: SHIPPED | OUTPATIENT
Start: 2020-07-07 | End: 2020-09-21 | Stop reason: ALTCHOICE

## 2020-07-07 NOTE — TELEPHONE ENCOUNTER
I spoke with patient and relayed Dr. Laisha Garrison message. She verbalized understanding. urine collected and sent to lab

## 2020-07-07 NOTE — TELEPHONE ENCOUNTER
I spoke with patient and she has had dysuria. She is agreeable to give a specimen at the lab. Order placed. She will try to go this morning. To Dr. Renuka Ballard.

## 2020-07-07 NOTE — TELEPHONE ENCOUNTER
Regarding: Prescription Question  ----- Message from Myke Juarez sent at 7/7/2020  9:24 AM CDT -----       ----- Message from TALYA to Lily Regalado DO sent at 7/7/2020  5:29 AM -----   Good morning Dr Chiqui Garza,   I believe I have a UTI. It started with a constant urge to urinate yesterday around 7 PM. The urge increased throughout the night, I was not able to sleep well due to frequent urinating. Now I am experiencing burning when urinating.  Is it possible to get a  prescription?    -Elbert Sewell

## 2020-07-07 NOTE — TELEPHONE ENCOUNTER
Please notify that her urine does look like possible infection; I have sent in keflex to be taken twice daily for 5 days. She had some blood in the urine which can be normal with an infection but I would like to recheck this in 2 weeks to make sure the blood has cleared up. I put in an order for a urine test--she should plan to do that the last week of July.      She should call if her symptoms are not better in 48-72 hours

## 2020-07-07 NOTE — TELEPHONE ENCOUNTER
Tino Taveras routed conversation to [facility] Clinical Staff 8 minutes ago (9:24 AM)      Kathy Cordoba 8 minutes ago (9:24 AM)         Pt. Is calling to report an increase in symptoms when she urinates it is extremely painful and when she wipes she seen blood and it's not time for her menses she is having a lot of discomfort and it feels like she has to go all the time please advise ph.  # [phone]  Routed high to clinical          Documentation

## 2020-07-07 NOTE — TELEPHONE ENCOUNTER
Pt. Is calling to report an increase in symptoms when she urinates it is extremely painful and when she wipes she seen blood and it's not time for her menses she is having a lot of discomfort and it feels like she has to go all the time please advise ph. #

## 2020-07-28 ENCOUNTER — PATIENT MESSAGE (OUTPATIENT)
Dept: INTERNAL MEDICINE CLINIC | Facility: CLINIC | Age: 40
End: 2020-07-28

## 2020-07-28 ENCOUNTER — APPOINTMENT (OUTPATIENT)
Dept: LAB | Age: 40
End: 2020-07-28
Attending: INTERNAL MEDICINE
Payer: COMMERCIAL

## 2020-07-28 LAB
BACTERIA UR QL AUTO: NEGATIVE /HPF
BILIRUB UR QL: NEGATIVE
CLARITY UR: CLEAR
COLOR UR: COLORLESS
GLUCOSE UR-MCNC: NEGATIVE MG/DL
HGB UR QL STRIP.AUTO: NEGATIVE
KETONES UR-MCNC: NEGATIVE MG/DL
NITRITE UR QL STRIP.AUTO: NEGATIVE
PH UR: 7 [PH] (ref 5–8)
PROT UR-MCNC: NEGATIVE MG/DL
RBC #/AREA URNS AUTO: 1 /HPF
SP GR UR STRIP: 1 (ref 1–1.03)
UROBILINOGEN UR STRIP-ACNC: <2
WBC #/AREA URNS AUTO: 3 /HPF

## 2020-07-28 PROCEDURE — 81001 URINALYSIS AUTO W/SCOPE: CPT | Performed by: INTERNAL MEDICINE

## 2020-07-28 NOTE — TELEPHONE ENCOUNTER
From: Cruz Lozoya  To: Shine Anderson DO  Sent: 7/28/2020 7:28 AM CDT  Subject: Other    Good morning, I had a urine culture and UA done a few weeks ago. I was instructed to go back for another urine culture.  I just want to know if there is already a lab order

## 2020-09-20 PROBLEM — M35.01 SJOGREN'S SYNDROME WITH KERATOCONJUNCTIVITIS SICCA (HCC): Status: ACTIVE | Noted: 2020-09-20

## 2020-09-20 PROBLEM — I10 ESSENTIAL HYPERTENSION: Status: ACTIVE | Noted: 2020-09-20

## 2020-09-20 NOTE — PROGRESS NOTES
Ling Espinoza is a 36year old female who presents for     6 mo check (last saw Dr Zuleika Valdovinos 3/2/20; Dr Zuleika Valdovinos currently on leave). Hypertension--on amlodipine 2.5mg daily. Doesn't check home BPs.      Sjogrens--follows with Dr Yudi Hall rx Plaquenil and celebrex Tobacco Use      Smoking status: Former Smoker        Packs/day: 0.00      Smokeless tobacco: Never Used      Tobacco comment: former social smoker in teenage years    Alcohol use: Not Currently      Frequency: Never    Drug use: Never         Allergies: Has celebrex 100 mg daily per rheumatologist.   Add flexeril 5 mg HS prn muscle relaxer. Salon pas patch. Discussed avoid lifting and any activities that aggravate back pain. Order given for PT at [address] [place] [address].  She might go to PT at [facility]

## 2020-09-21 ENCOUNTER — OFFICE VISIT (OUTPATIENT)
Dept: INTERNAL MEDICINE CLINIC | Facility: CLINIC | Age: 40
End: 2020-09-21
Payer: COMMERCIAL

## 2020-09-21 VITALS
OXYGEN SATURATION: 98 % | BODY MASS INDEX: 30.03 KG/M2 | WEIGHT: 163.19 LBS | HEIGHT: 62 IN | RESPIRATION RATE: 16 BRPM | SYSTOLIC BLOOD PRESSURE: 126 MMHG | HEART RATE: 86 BPM | TEMPERATURE: 98 F | DIASTOLIC BLOOD PRESSURE: 80 MMHG

## 2020-09-21 DIAGNOSIS — I10 ESSENTIAL HYPERTENSION: Primary | ICD-10-CM

## 2020-09-21 DIAGNOSIS — G89.29 CHRONIC BILATERAL LOW BACK PAIN WITHOUT SCIATICA: ICD-10-CM

## 2020-09-21 DIAGNOSIS — Z12.31 VISIT FOR SCREENING MAMMOGRAM: ICD-10-CM

## 2020-09-21 DIAGNOSIS — M35.01 SJOGREN'S SYNDROME WITH KERATOCONJUNCTIVITIS SICCA (HCC): ICD-10-CM

## 2020-09-21 DIAGNOSIS — M54.50 CHRONIC BILATERAL LOW BACK PAIN WITHOUT SCIATICA: ICD-10-CM

## 2020-09-21 PROCEDURE — 90471 IMMUNIZATION ADMIN: CPT | Performed by: INTERNAL MEDICINE

## 2020-09-21 PROCEDURE — 3079F DIAST BP 80-89 MM HG: CPT | Performed by: INTERNAL MEDICINE

## 2020-09-21 PROCEDURE — 3008F BODY MASS INDEX DOCD: CPT | Performed by: INTERNAL MEDICINE

## 2020-09-21 PROCEDURE — 3074F SYST BP LT 130 MM HG: CPT | Performed by: INTERNAL MEDICINE

## 2020-09-21 PROCEDURE — 99214 OFFICE O/P EST MOD 30 MIN: CPT | Performed by: INTERNAL MEDICINE

## 2020-09-21 PROCEDURE — 90686 IIV4 VACC NO PRSV 0.5 ML IM: CPT | Performed by: INTERNAL MEDICINE

## 2020-09-21 RX ORDER — CYCLOBENZAPRINE HCL 5 MG
5 TABLET ORAL NIGHTLY PRN
Qty: 20 TABLET | Refills: 1 | Status: SHIPPED | OUTPATIENT
Start: 2020-09-21 | End: 2020-11-03

## 2020-10-11 ENCOUNTER — HOSPITAL ENCOUNTER (OUTPATIENT)
Dept: MAMMOGRAPHY | Facility: HOSPITAL | Age: 40
Discharge: HOME | End: 2020-10-11
Attending: INTERNAL MEDICINE
Payer: COMMERCIAL

## 2020-10-11 DIAGNOSIS — Z12.31 VISIT FOR SCREENING MAMMOGRAM: ICD-10-CM

## 2020-10-11 PROCEDURE — 77067 SCR MAMMO BI INCL CAD: CPT | Performed by: INTERNAL MEDICINE

## 2020-10-11 PROCEDURE — 77063 BREAST TOMOSYNTHESIS BI: CPT | Performed by: INTERNAL MEDICINE

## 2020-10-14 RX ORDER — HYDROXYCHLOROQUINE SULFATE 200 MG/1
400 TABLET, FILM COATED ORAL DAILY
Qty: 60 TABLET | Refills: 2 | Status: SHIPPED | OUTPATIENT
Start: 2020-10-14 | End: 2020-12-23

## 2020-10-14 NOTE — TELEPHONE ENCOUNTER
Requested Prescriptions     Pending Prescriptions Disp Refills   • Hydroxychloroquine Sulfate 200 MG Oral Tab 60 tablet 2     Sig: Take 2 tablets (400 mg total) by mouth daily.      Last eye exams; ?  LF: 6/24/20 #60 TAB W/ 2 RF  LOV: 6/24/20  Future Appoin    Sjogren's +MARLENE, SSA, SSB, smith, +sicca symptoms)  - Continue Plaquenil 200 mg twice a day about 1 month ago  - she has joint pain, dry eyes and dry mouth  - has not seen ophthalmology yet, advised that she will need to see them as she is on Plaquenil

## 2020-11-02 ENCOUNTER — OFFICE VISIT (OUTPATIENT)
Dept: INTERNAL MEDICINE CLINIC | Facility: CLINIC | Age: 40
End: 2020-11-02
Payer: COMMERCIAL

## 2020-11-02 VITALS
OXYGEN SATURATION: 98 % | TEMPERATURE: 98 F | SYSTOLIC BLOOD PRESSURE: 124 MMHG | DIASTOLIC BLOOD PRESSURE: 80 MMHG | HEIGHT: 62 IN | WEIGHT: 165 LBS | BODY MASS INDEX: 30.36 KG/M2 | HEART RATE: 90 BPM

## 2020-11-02 DIAGNOSIS — M54.50 CHRONIC BILATERAL LOW BACK PAIN WITHOUT SCIATICA: Primary | ICD-10-CM

## 2020-11-02 DIAGNOSIS — G89.29 CHRONIC BILATERAL LOW BACK PAIN WITHOUT SCIATICA: Primary | ICD-10-CM

## 2020-11-02 PROCEDURE — 3008F BODY MASS INDEX DOCD: CPT | Performed by: INTERNAL MEDICINE

## 2020-11-02 PROCEDURE — 3079F DIAST BP 80-89 MM HG: CPT | Performed by: INTERNAL MEDICINE

## 2020-11-02 PROCEDURE — 3074F SYST BP LT 130 MM HG: CPT | Performed by: INTERNAL MEDICINE

## 2020-11-02 PROCEDURE — 99213 OFFICE O/P EST LOW 20 MIN: CPT | Performed by: INTERNAL MEDICINE

## 2020-11-02 NOTE — PROGRESS NOTES
Shira Allen is a 36year old female who presents for     6 wk check ( Dr Chelle Ruvalcaba remains on leave). Back pain follow up   Back pain for since age 25  Pain in low back is about 20% better.    Still stiff in am--\"it took 35 minutes to get moving this morning Temp 97.5 °F (36.4 °C) (Oral)   Ht 5' 2\" (1.575 m)   Wt 165 lb (74.8 kg)   LMP 10/22/2020 (Approximate)   SpO2 98%   Breastfeeding No   BMI 30.18 kg/m²       Wt Readings from Last 6 Encounters:  11/02/20 : 165 lb (74.8 kg)  09/21/20 : 163 lb 3.2 oz (74 kg • Hydroxychloroquine Sulfate 200 MG Oral Tab Take 2 tablets (400 mg total) by mouth daily. 60 tablet 2   • cyclobenzaprine 5 MG Oral Tab Take 1 tablet (5 mg total) by mouth nightly as needed (back pain).  20 tablet 1   • amLODIPine Besylate 2.5 MG Oral Ta

## 2020-11-03 RX ORDER — CYCLOBENZAPRINE HCL 5 MG
TABLET ORAL
Qty: 20 TABLET | Refills: 1 | Status: SHIPPED | OUTPATIENT
Start: 2020-11-03

## 2020-11-23 ENCOUNTER — HOSPITAL ENCOUNTER (OUTPATIENT)
Dept: GENERAL RADIOLOGY | Facility: HOSPITAL | Age: 40
Discharge: HOME | End: 2020-11-23
Attending: INTERNAL MEDICINE
Payer: COMMERCIAL

## 2020-11-23 DIAGNOSIS — G89.29 CHRONIC BILATERAL LOW BACK PAIN WITHOUT SCIATICA: ICD-10-CM

## 2020-11-23 DIAGNOSIS — M54.50 CHRONIC BILATERAL LOW BACK PAIN WITHOUT SCIATICA: ICD-10-CM

## 2020-11-23 PROCEDURE — 72110 X-RAY EXAM L-2 SPINE 4/>VWS: CPT | Performed by: INTERNAL MEDICINE

## 2020-11-27 ENCOUNTER — TELEPHONE (OUTPATIENT)
Dept: INTERNAL MEDICINE CLINIC | Facility: CLINIC | Age: 40
End: 2020-11-27

## 2020-11-27 NOTE — TELEPHONE ENCOUNTER
To nursing, please tell patient x-ray lumbar spine shows some narrowing of the disc space between the fifth lumbar vertebrae and the sacrum. Go ahead with physical therapy if has not yet started. See me 12/15/20 as planned. Thanks.       Note to self–

## 2020-12-22 ENCOUNTER — OFFICE VISIT (OUTPATIENT)
Dept: OPHTHALMOLOGY | Facility: CLINIC | Age: 40
End: 2020-12-22
Payer: COMMERCIAL

## 2020-12-22 DIAGNOSIS — H43.393 FLOATER, VITREOUS, BILATERAL: ICD-10-CM

## 2020-12-22 DIAGNOSIS — H47.399 CUPPING OF THE OPTIC DISC NOT DUE TO GLAUCOMA: ICD-10-CM

## 2020-12-22 DIAGNOSIS — H52.13 MYOPIA OF BOTH EYES: ICD-10-CM

## 2020-12-22 DIAGNOSIS — M35.01 SJOGREN'S SYNDROME WITH KERATOCONJUNCTIVITIS SICCA (HCC): ICD-10-CM

## 2020-12-22 DIAGNOSIS — Z79.899 LONG-TERM USE OF PLAQUENIL: Primary | ICD-10-CM

## 2020-12-22 PROCEDURE — 99243 OFF/OP CNSLTJ NEW/EST LOW 30: CPT | Performed by: OPHTHALMOLOGY

## 2020-12-22 PROCEDURE — 92015 DETERMINE REFRACTIVE STATE: CPT | Performed by: OPHTHALMOLOGY

## 2020-12-22 NOTE — PROGRESS NOTES
Bella Guerra is a 36year old female.     HPI:     HPI     Consult      Additional comments: Per Dr Jessica Liang     NP here for a plaquenil exam. Pt is taking Plaquenil 200 mg 2 tablets PO QD since for Sjogren's syndrome since summer 2019 (was Currently      Frequency: Never    Drug use: Never      Medications:  Current Outpatient Medications   Medication Sig Dispense Refill   • CYCLOBENZAPRINE 5 MG Oral Tab TAKE 1 TABLET(5 MG) BY MOUTH EVERY NIGHT AS NEEDED FOR BACK PAIN 20 tablet 1   • Diclofe Disc Sloping margin, Temporal crescent, Tilted disc Sloping margin, Temporal crescent, Tilted disc    C/D Ratio 0.6 0.8    Macula Normal- no plaquenil toxicity  Normal- no plaquenil toxicity     Vessels Normal Normal    Periphery Normal Normal both eyes, but that it is physiologic cupping due to myopia. IOP is normal today and there is no family history of glaucoma. There is no diagnosis of glaucoma at this time, but will follow yearly.       No orders of the defined types were placed in this

## 2020-12-22 NOTE — ASSESSMENT & PLAN NOTE
Discussed with patient that she has enlarged cup to disc ratio of the optic nerves in both eyes, but that it is physiologic cupping due to myopia. IOP is normal today and there is no family history of glaucoma.   There is no diagnosis of glaucoma at this

## 2020-12-22 NOTE — ASSESSMENT & PLAN NOTE
Schirmer's test today normal tearing in both eyes. Patient was instructed to use warm compresses to the eyelids twice a day everyday.     Instructions for warm compress use:   Patient should place wash compresses on both eyelids for 5 minutes every morn

## 2020-12-22 NOTE — PATIENT INSTRUCTIONS
Myopia of both eyes  New glasses today to update as needed. Floater, vitreous, bilateral   There is no evidence of retinal pathology. All signs and symptoms of retinal detachment/tears explained in detail.     Patient instructed to call the office if t

## 2020-12-23 ENCOUNTER — OFFICE VISIT (OUTPATIENT)
Dept: RHEUMATOLOGY | Facility: CLINIC | Age: 40
End: 2020-12-23
Payer: COMMERCIAL

## 2020-12-23 VITALS
HEIGHT: 62 IN | HEART RATE: 80 BPM | SYSTOLIC BLOOD PRESSURE: 121 MMHG | DIASTOLIC BLOOD PRESSURE: 82 MMHG | BODY MASS INDEX: 29.63 KG/M2 | WEIGHT: 161 LBS

## 2020-12-23 DIAGNOSIS — M54.50 CHRONIC BILATERAL LOW BACK PAIN WITHOUT SCIATICA: ICD-10-CM

## 2020-12-23 DIAGNOSIS — Z51.81 THERAPEUTIC DRUG MONITORING: ICD-10-CM

## 2020-12-23 DIAGNOSIS — M35.01 SJOGREN'S SYNDROME WITH KERATOCONJUNCTIVITIS SICCA (HCC): Primary | ICD-10-CM

## 2020-12-23 DIAGNOSIS — G89.29 CHRONIC BILATERAL LOW BACK PAIN WITHOUT SCIATICA: ICD-10-CM

## 2020-12-23 PROCEDURE — 3079F DIAST BP 80-89 MM HG: CPT | Performed by: INTERNAL MEDICINE

## 2020-12-23 PROCEDURE — 3008F BODY MASS INDEX DOCD: CPT | Performed by: INTERNAL MEDICINE

## 2020-12-23 PROCEDURE — 3074F SYST BP LT 130 MM HG: CPT | Performed by: INTERNAL MEDICINE

## 2020-12-23 PROCEDURE — 99213 OFFICE O/P EST LOW 20 MIN: CPT | Performed by: INTERNAL MEDICINE

## 2020-12-23 RX ORDER — HYDROXYCHLOROQUINE SULFATE 200 MG/1
400 TABLET, FILM COATED ORAL DAILY
Qty: 60 TABLET | Refills: 2 | Status: SHIPPED | OUTPATIENT
Start: 2020-12-23

## 2020-12-23 NOTE — PROGRESS NOTES
Wendy Willis is a 36year old female. HPI:   No chief complaint on file. I had the pleasure of seeing Wendy Willis on 12/23/2020 for follow up Sjogren syndrome.      Current Medications:   mg bid- started oct 2019  OTC eye drops  Past medications visit):  Current Outpatient Medications   Medication Sig Dispense Refill   • CYCLOBENZAPRINE 5 MG Oral Tab TAKE 1 TABLET(5 MG) BY MOUTH EVERY NIGHT AS NEEDED FOR BACK PAIN 20 tablet 1   • Diclofenac Sodium 50 MG Oral Tab EC Take 1 tablet (50 mg total) by m x10(3) uL 0.03   Immature Granulocyte Absolute      0.00 - 1.00 x10(3) uL 0.00   Neutrophils %      % 55.6   Lymphocytes %      % 35.2   Monocytes %      % 7.9   Eosinophils %      % 0.8   Basophils %      % 0.5   Immature Granulocyte %      % 0.0   CREATI COMPLEMENT C3      90.0 - 180.0 mg/dL 122.0   Centromere Ab, IgG      0 - 40 AU/mL 1   Anti-Smith/RNP Antibody      Negative Moderate Positive (A)   Anti-Smith Antibody      Negative Moderate Positive (A)   Anti-Sjogren's A      Negative Strong Positive - 40 AU/mL    Anti Double Strand DNA      <10      Imaging:     XR L spine 11/23/2020:  CONCLUSION:   1. Disc degeneration which is most pronounced at the L5-S1 level. 2. No radiographically visible acute osseous injury of the lumbar spine.     ASSESSMENT/

## 2020-12-23 NOTE — PATIENT INSTRUCTIONS
You were seen today for Sjogren's, your symptoms are stable  Continue Plaquenil  For your back pain, continue diclofenac as needed  Try physical therapy  If your symptoms continue may need an MRI  Try Tylenol arthritis 650 mg 1-3 times a day as needed  Fol

## 2021-01-13 ENCOUNTER — OFFICE VISIT (OUTPATIENT)
Dept: INTERNAL MEDICINE CLINIC | Facility: CLINIC | Age: 41
End: 2021-01-13
Payer: COMMERCIAL

## 2021-01-13 VITALS
TEMPERATURE: 98 F | HEART RATE: 83 BPM | BODY MASS INDEX: 30.73 KG/M2 | OXYGEN SATURATION: 97 % | DIASTOLIC BLOOD PRESSURE: 90 MMHG | HEIGHT: 62 IN | WEIGHT: 167 LBS | SYSTOLIC BLOOD PRESSURE: 118 MMHG

## 2021-01-13 DIAGNOSIS — M79.642 LEFT HAND PAIN: Primary | ICD-10-CM

## 2021-01-13 PROCEDURE — 3074F SYST BP LT 130 MM HG: CPT | Performed by: INTERNAL MEDICINE

## 2021-01-13 PROCEDURE — 3080F DIAST BP >= 90 MM HG: CPT | Performed by: INTERNAL MEDICINE

## 2021-01-13 PROCEDURE — 3008F BODY MASS INDEX DOCD: CPT | Performed by: INTERNAL MEDICINE

## 2021-01-13 PROCEDURE — 99213 OFFICE O/P EST LOW 20 MIN: CPT | Performed by: INTERNAL MEDICINE

## 2021-01-14 ENCOUNTER — HOSPITAL ENCOUNTER (OUTPATIENT)
Dept: GENERAL RADIOLOGY | Facility: HOSPITAL | Age: 41
Discharge: HOME | End: 2021-01-14
Attending: INTERNAL MEDICINE
Payer: COMMERCIAL

## 2021-01-14 DIAGNOSIS — M79.642 LEFT HAND PAIN: ICD-10-CM

## 2021-01-14 PROCEDURE — 73130 X-RAY EXAM OF HAND: CPT | Performed by: INTERNAL MEDICINE
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
## 2021-03-01 RX ORDER — AMLODIPINE BESYLATE 2.5 MG/1
TABLET ORAL
Qty: 90 TABLET | Refills: 0 | Status: SHIPPED | OUTPATIENT
Start: 2021-03-01 | End: 2021-04-15

## 2021-04-15 ENCOUNTER — OFFICE VISIT (OUTPATIENT)
Dept: INTERNAL MEDICINE CLINIC | Facility: CLINIC | Age: 41
End: 2021-04-15
Payer: COMMERCIAL

## 2021-04-15 VITALS
HEIGHT: 62 IN | BODY MASS INDEX: 29.85 KG/M2 | SYSTOLIC BLOOD PRESSURE: 130 MMHG | HEART RATE: 84 BPM | DIASTOLIC BLOOD PRESSURE: 82 MMHG | OXYGEN SATURATION: 98 % | WEIGHT: 162.19 LBS

## 2021-04-15 DIAGNOSIS — I10 ESSENTIAL HYPERTENSION: Primary | ICD-10-CM

## 2021-04-15 DIAGNOSIS — H65.03 NON-RECURRENT ACUTE SEROUS OTITIS MEDIA OF BOTH EARS: ICD-10-CM

## 2021-04-15 DIAGNOSIS — Z00.00 ROUTINE HEALTH MAINTENANCE: ICD-10-CM

## 2021-04-15 PROCEDURE — 3075F SYST BP GE 130 - 139MM HG: CPT | Performed by: INTERNAL MEDICINE

## 2021-04-15 PROCEDURE — 3008F BODY MASS INDEX DOCD: CPT | Performed by: INTERNAL MEDICINE

## 2021-04-15 PROCEDURE — 3079F DIAST BP 80-89 MM HG: CPT | Performed by: INTERNAL MEDICINE

## 2021-04-15 PROCEDURE — 99396 PREV VISIT EST AGE 40-64: CPT | Performed by: INTERNAL MEDICINE

## 2021-04-15 RX ORDER — AMLODIPINE BESYLATE 2.5 MG/1
2.5 TABLET ORAL DAILY
Qty: 90 TABLET | Refills: 3 | Status: SHIPPED | OUTPATIENT
Start: 2021-04-15

## 2021-04-15 NOTE — PROGRESS NOTES
Jayce Enriquez is a 36year old female. Patient presents with:  Physical: Here today for Annual Physical; Last PAP normal in 11/2019       HPI:   Jayce Enriquez is a 36year old female who presents for a complete physical exam.   Usually sees Dr. Sameera Gamble     3 weeks a Attack Maternal Uncle 45   • Other (valvular disease) Maternal Aunt    • Heart Disease Maternal Uncle 63   • Glaucoma Neg    • Macular degeneration Neg       Social History:   Social History    Tobacco Use      Smoking status: Former Smoker        Packs/da oral course of steroids    Silvestreine MD Kenny  4/15/2021  6:18 PM

## 2021-04-21 ENCOUNTER — LAB ENCOUNTER (OUTPATIENT)
Dept: LAB | Age: 41
End: 2021-04-21
Attending: INTERNAL MEDICINE
Payer: COMMERCIAL

## 2021-04-21 DIAGNOSIS — I10 ESSENTIAL HYPERTENSION: ICD-10-CM

## 2021-04-21 DIAGNOSIS — Z00.00 ROUTINE HEALTH MAINTENANCE: ICD-10-CM

## 2021-04-21 PROCEDURE — 82306 VITAMIN D 25 HYDROXY: CPT

## 2021-04-21 PROCEDURE — 80053 COMPREHEN METABOLIC PANEL: CPT

## 2021-04-21 PROCEDURE — 85025 COMPLETE CBC W/AUTO DIFF WBC: CPT

## 2021-04-21 PROCEDURE — 80061 LIPID PANEL: CPT

## 2021-04-21 PROCEDURE — 36415 COLL VENOUS BLD VENIPUNCTURE: CPT

## 2021-04-21 PROCEDURE — 84443 ASSAY THYROID STIM HORMONE: CPT | Performed by: INTERNAL MEDICINE

## 2021-05-05 DIAGNOSIS — E55.9 VITAMIN D DEFICIENCY: ICD-10-CM

## 2021-05-05 DIAGNOSIS — R74.8 ELEVATED ALKALINE PHOSPHATASE LEVEL: Primary | ICD-10-CM

## 2021-05-05 NOTE — TELEPHONE ENCOUNTER
Please call pt with labs. A couple of minor abnormalities -- slightly elevated alk phos -- unclear if significant; would just repeat in 3 months; often corrects itself.     Globulin (antibody) level slightly elevated but unchanged since 2019 -- likely du

## 2021-05-06 RX ORDER — ERGOCALCIFEROL 1.25 MG/1
50000 CAPSULE ORAL WEEKLY
Qty: 13 CAPSULE | Refills: 3 | Status: SHIPPED | OUTPATIENT
Start: 2021-05-06

## 2021-05-06 NOTE — TELEPHONE ENCOUNTER
Spoke to patient and relayed MD message. Patient verbalized understanding. Rx sent per MD written order to pt's preferred phamacy.

## 2022-05-11 ENCOUNTER — OFFICE VISIT (OUTPATIENT)
Dept: INTERNAL MEDICINE CLINIC | Facility: CLINIC | Age: 42
End: 2022-05-11
Payer: COMMERCIAL

## 2022-05-11 VITALS
DIASTOLIC BLOOD PRESSURE: 80 MMHG | SYSTOLIC BLOOD PRESSURE: 122 MMHG | HEIGHT: 62 IN | BODY MASS INDEX: 30.55 KG/M2 | OXYGEN SATURATION: 98 % | HEART RATE: 69 BPM | WEIGHT: 166 LBS | TEMPERATURE: 98 F

## 2022-05-11 DIAGNOSIS — Z00.00 ROUTINE HEALTH MAINTENANCE: Primary | ICD-10-CM

## 2022-05-11 DIAGNOSIS — R92.2 DENSE BREAST: ICD-10-CM

## 2022-05-11 DIAGNOSIS — M35.01 SJOGREN'S SYNDROME WITH KERATOCONJUNCTIVITIS SICCA (HCC): ICD-10-CM

## 2022-05-11 DIAGNOSIS — N92.0 MENORRHAGIA WITH REGULAR CYCLE: ICD-10-CM

## 2022-05-11 DIAGNOSIS — Z12.31 ENCOUNTER FOR SCREENING MAMMOGRAM FOR MALIGNANT NEOPLASM OF BREAST: ICD-10-CM

## 2022-05-11 PROCEDURE — 3074F SYST BP LT 130 MM HG: CPT | Performed by: INTERNAL MEDICINE

## 2022-05-11 PROCEDURE — 3079F DIAST BP 80-89 MM HG: CPT | Performed by: INTERNAL MEDICINE

## 2022-05-11 PROCEDURE — 3008F BODY MASS INDEX DOCD: CPT | Performed by: INTERNAL MEDICINE

## 2022-05-11 PROCEDURE — 99396 PREV VISIT EST AGE 40-64: CPT | Performed by: INTERNAL MEDICINE

## 2022-08-30 ENCOUNTER — OFFICE VISIT (OUTPATIENT)
Dept: INTERNAL MEDICINE CLINIC | Facility: CLINIC | Age: 42
End: 2022-08-30
Payer: COMMERCIAL

## 2022-08-30 VITALS
HEART RATE: 90 BPM | TEMPERATURE: 98 F | SYSTOLIC BLOOD PRESSURE: 132 MMHG | OXYGEN SATURATION: 98 % | DIASTOLIC BLOOD PRESSURE: 100 MMHG | WEIGHT: 160 LBS | HEIGHT: 62 IN | BODY MASS INDEX: 29.44 KG/M2

## 2022-08-30 DIAGNOSIS — Z12.4 SCREENING FOR CERVICAL CANCER: Primary | ICD-10-CM

## 2022-08-30 DIAGNOSIS — I10 ESSENTIAL HYPERTENSION: ICD-10-CM

## 2022-08-30 PROCEDURE — 99214 OFFICE O/P EST MOD 30 MIN: CPT | Performed by: INTERNAL MEDICINE

## 2022-08-30 PROCEDURE — 3075F SYST BP GE 130 - 139MM HG: CPT | Performed by: INTERNAL MEDICINE

## 2022-08-30 PROCEDURE — 3080F DIAST BP >= 90 MM HG: CPT | Performed by: INTERNAL MEDICINE

## 2022-08-30 PROCEDURE — 3008F BODY MASS INDEX DOCD: CPT | Performed by: INTERNAL MEDICINE

## 2022-08-30 RX ORDER — HYDROXYCHLOROQUINE SULFATE 200 MG/1
400 TABLET, FILM COATED ORAL DAILY
Qty: 60 TABLET | Refills: 2 | Status: CANCELLED | OUTPATIENT
Start: 2022-08-30

## 2022-08-30 RX ORDER — AMLODIPINE BESYLATE 2.5 MG/1
2.5 TABLET ORAL DAILY
Qty: 90 TABLET | Refills: 3 | Status: SHIPPED | OUTPATIENT
Start: 2022-08-30

## 2022-09-14 ENCOUNTER — PATIENT MESSAGE (OUTPATIENT)
Dept: INTERNAL MEDICINE CLINIC | Facility: CLINIC | Age: 42
End: 2022-09-14

## 2022-09-14 NOTE — TELEPHONE ENCOUNTER
From: Thompson Allen  To: Sho Borja DO  Sent: 9/14/2022 3:47 PM CDT  Subject: BP Readings    9/6 128/78   9/7 126/76  9/8 124/76  9/9 124/74  9/10 122/76  9/11 120/76  9/12 118/70  9/13 120/74  9/14 118/72

## 2022-09-15 LAB — HPV I/H RISK 1 DNA SPEC QL NAA+PROBE: NEGATIVE

## 2023-05-03 ENCOUNTER — NURSE ONLY (OUTPATIENT)
Dept: INTERNAL MEDICINE CLINIC | Facility: HOSPITAL | Age: 43
End: 2023-05-03
Attending: PREVENTIVE MEDICINE

## 2023-05-03 DIAGNOSIS — Z00.00 WELLNESS EXAMINATION: Primary | ICD-10-CM

## 2023-05-03 LAB
HBV SURFACE AB SER QL: REACTIVE
HBV SURFACE AB SERPL IA-ACNC: 25.06 MIU/ML
RUBV IGG SER QL: POSITIVE
RUBV IGG SER-ACNC: 72.2 IU/ML (ref 10–?)

## 2023-05-03 PROCEDURE — 86735 MUMPS ANTIBODY: CPT

## 2023-05-03 PROCEDURE — 86480 TB TEST CELL IMMUN MEASURE: CPT

## 2023-05-03 PROCEDURE — 86762 RUBELLA ANTIBODY: CPT

## 2023-05-03 PROCEDURE — 86765 RUBEOLA ANTIBODY: CPT

## 2023-05-03 PROCEDURE — 86787 VARICELLA-ZOSTER ANTIBODY: CPT

## 2023-05-03 PROCEDURE — 86706 HEP B SURFACE ANTIBODY: CPT

## 2023-05-03 RX ORDER — BNT162B2 ORIGINAL AND OMICRON BA.4/BA.5 .1125; .1125 MG/2.25ML; MG/2.25ML
0.3 INJECTION, SUSPENSION INTRAMUSCULAR ONCE
Qty: 0.3 ML | Refills: 0 | Status: SHIPPED
Start: 2023-05-03 | End: 2023-05-03

## 2023-05-04 LAB
M TB IFN-G CD4+ T-CELLS BLD-ACNC: 0 IU/ML
M TB TUBERC IFN-G BLD QL: NEGATIVE
M TB TUBERC IGNF/MITOGEN IGNF CONTROL: >10 IU/ML
QFT TB1 AG MINUS NIL: 0.03 IU/ML
QFT TB2 AG MINUS NIL: 0.05 IU/ML

## 2023-05-05 LAB
MEV IGG SER-ACNC: 192 AU/ML (ref 16.5–?)
MUV IGG SER IA-ACNC: >300 AU/ML (ref 11–?)
VZV IGG SER IA-ACNC: 1305 (ref 165–?)

## 2023-11-01 ENCOUNTER — OFFICE VISIT (OUTPATIENT)
Dept: INTERNAL MEDICINE CLINIC | Facility: CLINIC | Age: 43
End: 2023-11-01

## 2023-11-01 VITALS
SYSTOLIC BLOOD PRESSURE: 126 MMHG | HEART RATE: 86 BPM | HEIGHT: 62 IN | TEMPERATURE: 99 F | DIASTOLIC BLOOD PRESSURE: 78 MMHG | WEIGHT: 176 LBS | BODY MASS INDEX: 32.39 KG/M2 | RESPIRATION RATE: 16 BRPM | OXYGEN SATURATION: 99 %

## 2023-11-01 DIAGNOSIS — Z11.3 SCREENING FOR STD (SEXUALLY TRANSMITTED DISEASE): ICD-10-CM

## 2023-11-01 DIAGNOSIS — R92.30 DENSE BREAST: ICD-10-CM

## 2023-11-01 DIAGNOSIS — Z12.31 ENCOUNTER FOR SCREENING MAMMOGRAM FOR MALIGNANT NEOPLASM OF BREAST: ICD-10-CM

## 2023-11-01 DIAGNOSIS — R92.2 DENSE BREAST: ICD-10-CM

## 2023-11-01 DIAGNOSIS — Z12.4 SCREENING FOR MALIGNANT NEOPLASM OF CERVIX: ICD-10-CM

## 2023-11-01 DIAGNOSIS — N92.0 MENORRHAGIA WITH REGULAR CYCLE: ICD-10-CM

## 2023-11-01 DIAGNOSIS — Z00.00 ANNUAL PHYSICAL EXAM: Primary | ICD-10-CM

## 2023-11-01 PROCEDURE — 3008F BODY MASS INDEX DOCD: CPT | Performed by: INTERNAL MEDICINE

## 2023-11-01 PROCEDURE — 3078F DIAST BP <80 MM HG: CPT | Performed by: INTERNAL MEDICINE

## 2023-11-01 PROCEDURE — 99396 PREV VISIT EST AGE 40-64: CPT | Performed by: INTERNAL MEDICINE

## 2023-11-01 PROCEDURE — 3074F SYST BP LT 130 MM HG: CPT | Performed by: INTERNAL MEDICINE

## 2023-11-01 RX ORDER — AMLODIPINE BESYLATE 2.5 MG/1
2.5 TABLET ORAL DAILY
Qty: 90 TABLET | Refills: 3 | Status: SHIPPED | OUTPATIENT
Start: 2023-11-01

## 2023-11-02 LAB
C TRACH DNA SPEC QL NAA+PROBE: NEGATIVE
N GONORRHOEA DNA SPEC QL NAA+PROBE: NEGATIVE

## 2023-11-04 ENCOUNTER — LAB ENCOUNTER (OUTPATIENT)
Dept: LAB | Age: 43
End: 2023-11-04
Attending: INTERNAL MEDICINE
Payer: COMMERCIAL

## 2023-11-04 DIAGNOSIS — Z11.3 SCREENING FOR STD (SEXUALLY TRANSMITTED DISEASE): ICD-10-CM

## 2023-11-04 DIAGNOSIS — Z00.00 ANNUAL PHYSICAL EXAM: ICD-10-CM

## 2023-11-04 LAB
ALBUMIN SERPL-MCNC: 4.1 G/DL (ref 3.2–4.8)
ALBUMIN/GLOB SERPL: 1.1 {RATIO} (ref 1–2)
ALP LIVER SERPL-CCNC: 84 U/L
ALT SERPL-CCNC: 14 U/L
ANION GAP SERPL CALC-SCNC: 8 MMOL/L (ref 0–18)
AST SERPL-CCNC: 18 U/L (ref ?–34)
BASOPHILS # BLD AUTO: 0.03 X10(3) UL (ref 0–0.2)
BASOPHILS NFR BLD AUTO: 0.6 %
BILIRUB SERPL-MCNC: 0.6 MG/DL (ref 0.3–1.2)
BUN BLD-MCNC: 17 MG/DL (ref 9–23)
BUN/CREAT SERPL: 27 (ref 10–20)
CALCIUM BLD-MCNC: 8.8 MG/DL (ref 8.7–10.4)
CHLORIDE SERPL-SCNC: 104 MMOL/L (ref 98–112)
CHOLEST SERPL-MCNC: 138 MG/DL (ref ?–200)
CO2 SERPL-SCNC: 24 MMOL/L (ref 21–32)
CREAT BLD-MCNC: 0.63 MG/DL
DEPRECATED RDW RBC AUTO: 38.1 FL (ref 35.1–46.3)
EGFRCR SERPLBLD CKD-EPI 2021: 113 ML/MIN/1.73M2 (ref 60–?)
EOSINOPHIL # BLD AUTO: 0.07 X10(3) UL (ref 0–0.7)
EOSINOPHIL NFR BLD AUTO: 1.3 %
ERYTHROCYTE [DISTWIDTH] IN BLOOD BY AUTOMATED COUNT: 11.9 % (ref 11–15)
FASTING PATIENT LIPID ANSWER: YES
FASTING STATUS PATIENT QL REPORTED: YES
GLOBULIN PLAS-MCNC: 3.9 G/DL (ref 2.8–4.4)
GLUCOSE BLD-MCNC: 91 MG/DL (ref 70–99)
HCT VFR BLD AUTO: 38.3 %
HDLC SERPL-MCNC: 32 MG/DL (ref 40–59)
HGB BLD-MCNC: 13 G/DL
IMM GRANULOCYTES # BLD AUTO: 0.01 X10(3) UL (ref 0–1)
IMM GRANULOCYTES NFR BLD: 0.2 %
LDLC SERPL CALC-MCNC: 81 MG/DL (ref ?–100)
LYMPHOCYTES # BLD AUTO: 1.56 X10(3) UL (ref 1–4)
LYMPHOCYTES NFR BLD AUTO: 29.3 %
MCH RBC QN AUTO: 29.5 PG (ref 26–34)
MCHC RBC AUTO-ENTMCNC: 33.9 G/DL (ref 31–37)
MCV RBC AUTO: 86.8 FL
MONOCYTES # BLD AUTO: 0.63 X10(3) UL (ref 0.1–1)
MONOCYTES NFR BLD AUTO: 11.8 %
NEUTROPHILS # BLD AUTO: 3.03 X10 (3) UL (ref 1.5–7.7)
NEUTROPHILS # BLD AUTO: 3.03 X10(3) UL (ref 1.5–7.7)
NEUTROPHILS NFR BLD AUTO: 56.8 %
NONHDLC SERPL-MCNC: 106 MG/DL (ref ?–130)
OSMOLALITY SERPL CALC.SUM OF ELEC: 283 MOSM/KG (ref 275–295)
PLATELET # BLD AUTO: 248 10(3)UL (ref 150–450)
POTASSIUM SERPL-SCNC: 3.8 MMOL/L (ref 3.5–5.1)
PROT SERPL-MCNC: 8 G/DL (ref 5.7–8.2)
RBC # BLD AUTO: 4.41 X10(6)UL
SODIUM SERPL-SCNC: 136 MMOL/L (ref 136–145)
TRIGL SERPL-MCNC: 141 MG/DL (ref 30–149)
TSI SER-ACNC: 1.47 MIU/ML (ref 0.55–4.78)
VLDLC SERPL CALC-MCNC: 22 MG/DL (ref 0–30)
WBC # BLD AUTO: 5.3 X10(3) UL (ref 4–11)

## 2023-11-04 PROCEDURE — 84443 ASSAY THYROID STIM HORMONE: CPT

## 2023-11-04 PROCEDURE — 80061 LIPID PANEL: CPT

## 2023-11-04 PROCEDURE — 86780 TREPONEMA PALLIDUM: CPT

## 2023-11-04 PROCEDURE — 87389 HIV-1 AG W/HIV-1&-2 AB AG IA: CPT

## 2023-11-04 PROCEDURE — 85025 COMPLETE CBC W/AUTO DIFF WBC: CPT

## 2023-11-04 PROCEDURE — 80053 COMPREHEN METABOLIC PANEL: CPT

## 2023-11-04 PROCEDURE — 36415 COLL VENOUS BLD VENIPUNCTURE: CPT

## 2023-11-06 LAB — T PALLIDUM AB SER QL: NEGATIVE

## 2023-11-07 LAB
HPV I/H RISK 1 DNA SPEC QL NAA+PROBE: NEGATIVE
LAST PAP RESULT: NORMAL

## 2023-11-08 ENCOUNTER — TELEPHONE (OUTPATIENT)
Dept: INTERNAL MEDICINE CLINIC | Facility: CLINIC | Age: 43
End: 2023-11-08

## 2023-11-08 NOTE — TELEPHONE ENCOUNTER
Left message to call back. Form for patient is signed by MD, just need patient's waist in inches. Nursing to fax form in for patient after she calls back with info. Placed in Dr. Antonino Dove outbox.

## 2023-11-10 ENCOUNTER — OFFICE VISIT (OUTPATIENT)
Dept: RHEUMATOLOGY | Facility: CLINIC | Age: 43
End: 2023-11-10
Payer: COMMERCIAL

## 2023-11-10 VITALS
SYSTOLIC BLOOD PRESSURE: 135 MMHG | WEIGHT: 176 LBS | HEIGHT: 62 IN | HEART RATE: 83 BPM | BODY MASS INDEX: 32.39 KG/M2 | DIASTOLIC BLOOD PRESSURE: 91 MMHG

## 2023-11-10 DIAGNOSIS — M35.01 SJOGREN'S SYNDROME WITH KERATOCONJUNCTIVITIS SICCA (HCC): Primary | ICD-10-CM

## 2023-11-10 DIAGNOSIS — M79.671 PAIN IN BOTH FEET: ICD-10-CM

## 2023-11-10 DIAGNOSIS — M79.672 PAIN IN BOTH FEET: ICD-10-CM

## 2023-11-10 PROCEDURE — 3075F SYST BP GE 130 - 139MM HG: CPT | Performed by: INTERNAL MEDICINE

## 2023-11-10 PROCEDURE — 99214 OFFICE O/P EST MOD 30 MIN: CPT | Performed by: INTERNAL MEDICINE

## 2023-11-10 PROCEDURE — 3080F DIAST BP >= 90 MM HG: CPT | Performed by: INTERNAL MEDICINE

## 2023-11-10 PROCEDURE — 3008F BODY MASS INDEX DOCD: CPT | Performed by: INTERNAL MEDICINE

## 2023-11-10 RX ORDER — HYDROXYCHLOROQUINE SULFATE 200 MG/1
400 TABLET, FILM COATED ORAL DAILY
Qty: 180 TABLET | Refills: 1 | Status: SHIPPED | OUTPATIENT
Start: 2023-11-10

## 2023-11-10 NOTE — PROGRESS NOTES
Clifford Quintero is a 37year old female. HPI:     Chief Complaint   Patient presents with    Sjogren's Syndrome       I had the pleasure of seeing Clifford Quintero on 11/10/2023 for follow up Sjogren syndrome. Current Medications:   mg bid- started oct 2019, stopped [id number]  OTC eye drops  Past medications:  Plaquenil 5-6 years, has been off for 8 yrs  Gabapentin- for nerve pain in face, off now  Lyrica- nerve pain in face (bells palsy time), off now  Mobic- didn't help  Blood work:  +MARLENE, SSA, SSB, smith  Normal RF, CCP, C3 and C4, CBC and CMP    Interval History: This is a 45 yo F with recent hx of HTN, B/L Carpal tunnel, Frederic Palsy 2017 presents for f/u for Sjogren's. She was diagnosed with Sjogren's in 2004 when she presented with dry eyes and dry mouth. She was on Plaquenil for about 5 to 6 years but has been off of it now for about 8 years. For dry eyes she takes OTC eyedrops. She has not tried anything for dry mouth. She reports difficulty swallowing at times because of her dry mouth. She also reports joint pain in her R ankle, L elbow and PIPs. She reports stiffness in the morning but denies any swelling. She takes ibuprofen as needed which helps slightly. She also reports left shoulder blade pain with numbness and tingling. She is had numbness and tingling in her left shoulder blade for many years. 12/23/2023: Follow up for Sjogren's, on  mg bid  States that her Sjogrens has been stable  Saw ophthalmologist today and no evidence of retinal pathology. Schirmer test was also done which showed normal tearing in both eyes  Continues to have lower back pain, XR was done showing advanced narrowing in L5-S1 region  No radiculopathy symptoms  Back pain worse with movement, bending or prolonged standing    11/10/2023:   Follow up for Sjogren's, on  mg bid  States that her Sjogrens has been stable  Has been off HCQ over a year now   Having b/l heel pain, mostly on the left side  2 weeks ago had pain in the left groin and was limping for a few days  Hands will swell at times, pain in the left palm region  No rashes            HISTORY:  Past Medical History:   Diagnosis Date    Bell's palsy     Essential hypertension     Obesity     Sjogren's syndrome ([facility] [address]     Sjogren's syndrome ([facility] [address] 10/2019    Wears glasses       Social Hx Reviewed   Family Hx Reviewed     Medications (Active prior to today's visit):  Current Outpatient Medications   Medication Sig Dispense Refill    Nystatin 121832 UNIT/GM External Powder Apply 1 Application topically 4 (four) times daily. 60 g 3    amLODIPine 2.5 MG Oral Tab Take 1 tablet (2.5 mg total) by mouth daily. 90 tablet 3    Hydroxychloroquine Sulfate 200 MG Oral Tab Take 2 tablets (400 mg total) by mouth daily. 60 tablet 2    CYCLOBENZAPRINE 5 MG Oral Tab TAKE 1 TABLET(5 MG) BY MOUTH EVERY NIGHT AS NEEDED FOR BACK PAIN 20 tablet 1    ergocalciferol 1.25 MG (31086 UT) Oral Cap Take 1 capsule (50,000 Units total) by mouth once a week. (Patient not taking: Reported on 11/1/2023) 13 capsule 3     .cmed  Allergies:  No Known Allergies      ROS:   All other ROS are negative. PHYSICAL EXAM:   GEN: AAOx3, NAD  HEENT: EOMI, PERRLA, no injection or icterus, oral mucosa moist, no oral lesions. No lymphadenopathy. No facial rash  CVS: RRR, no murmurs rubs or gallops. Equal 2+ distal pulses. LUNGS: CTAB, no increased work of breathing  ABDOMEN:  soft NT/ND, +BS, no HSM  SKIN: No rashes or skin lesions. No nail findings  MSK: No synovitis or swelling in her joints, +FROM in all joints  TTP in paraspinal thoracic region  Pain across the lower back  NEURO: Cranial nerves II-XII intact grossly. 5/5 strength throughout in both upper and lower extremities, sensation intact.   PSYCH: normal mood       LABS:     Component      Latest Ref Rng & Units 2/26/2020   WBC      4.0 - 11.0 x10(3) uL 6.2   RBC      3.80 - 5.30 x10(6)uL 4.30   Hemoglobin      12.0 - 16.0 g/dL 13.0 Hematocrit      35.0 - 48.0 % 36.7   MCV      80.0 - 100.0 fL 85.3   MCH      26.0 - 34.0 pg 30.2   MCHC      31.0 - 37.0 g/dL 35.4   RDW-SD      35.1 - 46.3 fL 35.7   RDW      11.0 - 15.0 % 11.6   Platelet Count      153.2 - 450.0 10(3)uL 274.0   Prelim Neutrophil Abs      1.50 - 7.70 x10 (3) uL 3.47   Neutrophils Absolute      1.50 - 7.70 x10(3) uL 3.47   Lymphocytes Absolute      1.00 - 4.00 x10(3) uL 2.19   Monocytes Absolute      0.10 - 1.00 x10(3) uL 0.49   Eosinophils Absolute      0.00 - 0.70 x10(3) uL 0.05   Basophils Absolute      0.00 - 0.20 x10(3) uL 0.03   Immature Granulocyte Absolute      0.00 - 1.00 x10(3) uL 0.00   Neutrophils %      % 55.6   Lymphocytes %      % 35.2   Monocytes %      % 7.9   Eosinophils %      % 0.8   Basophils %      % 0.5   Immature Granulocyte %      % 0.0   CREATININE      0.55 - 1.02 mg/dL 0.62   eGFR NON-AFR. AMERICAN      >=60 114   eGFR       >=60 131   Albumin      3.4 - 5.0 g/dL 3.9   ALT (SGPT)      13 - 56 U/L 19   AST (SGOT)      15 - 37 U/L 13 (L)   C-REACTIVE PROTEIN      <0.30 mg/dL <0.29   SED RATE      0 - 20 mm/Hr 21 (H)       Component      Latest Ref Rng & Units 10/9/2019   Color Urine      Yellow Yellow   CLARITY URINE      Clear Clear   Spec Gravity      1.002 - 1.035 1.018   Glucose Urine      Negative mg/dL Negative   Bilirubin      Negative Negative   Ketones, UA      Negative mg/dL Negative   Blood Urine      Negative Small (A)   PH Urine      5.0 - 8.0 6.0   Protein Urine      Negative mg/dL Negative   Urobilinogen Urine      <2.0 <2.0   Nitrite Urine      Negative Negative   Leukocyte Esterase Urine      Negative Trace (A)   SQUAM EPI CELLS UR      /HPF Few   WBC Urine      0 - 5 /HPF <1   RBC URINE      0 - 2 /HPF 1   Bacteria Urine      None Seen /HPF Few (A)   Lupus Anticoag Screen Interp       Conclusion: Negative .  . .   PT      11.8 - 14.5 seconds 13.0   APTT      23.2 - 35.3 seconds 25.6   Hexagonal Phase Staclot LA Negative Negative   DRVVT Ratio      0.0 - 1.1 0.9   MARLENE Titer/Pattern      <80    Reviewed By:          BETA 2 GLYCOPROTEIN 1 AB, IgM      <=15.0 U/mL <3.7   BETA 2 GLYCOPROTEIN 1 AB, IgG      <=15.0 U/mL 3.9   Cardiolipin IgG Antibody      0.0 - 14.9 GPL 11.1   Cardiolipin IgM Antibody      0.0 - 12.4 MPL 5.6   MARLENE SCREEN      Negative    C-REACTIVE PROTEIN      <0.30 mg/dL <0.29   C-Citrullinated Peptide IgG AB      0.0 - 6.9 U/mL 1.9   SED RATE      0 - 20 mm/Hr 25 (H)   RHEUMATOID FACTOR      <15 IU/mL <10   COMPLEMENT C4      10.0 - 40.0 mg/dL 14.1   COMPLEMENT C3      90.0 - 180.0 mg/dL 122.0   Centromere Ab, IgG      0 - 40 AU/mL 1   Anti-Smith/RNP Antibody      Negative Moderate Positive (A)   Anti-Smith Antibody      Negative Moderate Positive (A)   Anti-Sjogren's A      Negative Strong Positive (A)   Anti-Sjogren's B      Negative Moderate Positive (A)   Scleroderma (Scl-70) (MONTY) Antibody, IgG      0 - 40 AU/mL 3   Anti Double Strand DNA      <10 <10     Component      Latest Ref Rng & Units 9/16/2019   Color Urine      Yellow    CLARITY URINE      Clear    Spec Gravity      1.002 - 1.035    Glucose Urine      Negative mg/dL    Bilirubin      Negative    Ketones, UA      Negative mg/dL    Blood Urine      Negative    PH Urine      5.0 - 8.0    Protein Urine      Negative mg/dL    Urobilinogen Urine      <2.0    Nitrite Urine      Negative    Leukocyte Esterase Urine      Negative    SQUAM EPI CELLS UR      /HPF    WBC Urine      0 - 5 /HPF    RBC URINE      0 - 2 /HPF    Bacteria Urine      None Seen /HPF    Lupus Anticoag Screen Interp          PT      11.8 - 14.5 seconds    APTT      23.2 - 35.3 seconds    Hexagonal Phase Staclot LA      Negative    DRVVT Ratio      0.0 - 1.1    MARLENE Titer/Pattern      <80 640 (A)   Reviewed By:       Ondina Sahu M.D.   BETA 2 GLYCOPROTEIN 1 AB, IgM      <=15.0 U/mL    BETA 2 GLYCOPROTEIN 1 AB, IgG      <=15.0 U/mL    Cardiolipin IgG Antibody      0.0 - 14.9 GPL Cardiolipin IgM Antibody      0.0 - 12.4 MPL    MARLENE SCREEN      Negative Positive (A)   C-REACTIVE PROTEIN      <0.30 mg/dL    C-Citrullinated Peptide IgG AB      0.0 - 6.9 U/mL    SED RATE      0 - 20 mm/Hr    RHEUMATOID FACTOR      <15 IU/mL    COMPLEMENT C4      10.0 - 40.0 mg/dL    COMPLEMENT C3      90.0 - 180.0 mg/dL    Centromere Ab, IgG      0 - 40 AU/mL    Anti-Nunez/RNP Antibody      Negative    Anti-Smith Antibody      Negative    Anti-Sjogren's A      Negative    Anti-Sjogren's B      Negative    Scleroderma (Scl-70) (MONTY) Antibody, IgG      0 - 40 AU/mL    Anti Double Strand DNA      <10      Imaging:     XR L spine 11/23/2020:  CONCLUSION:   1. Disc degeneration which is most pronounced at the L5-S1 level. 2. No radiographically visible acute osseous injury of the lumbar spine.     ASSESSMENT/PLAN:       Sjogren's +MARLENE, SSA, SSB, smith, +sicca symptoms)  - has been off HCQ for the past year   - she has joint pain, dry eyes and dry mouth  - She will see ophthalmology in the next 2 or 3 months  - Blood work reviewed from today and normal    Bilateral foot pain, planter fasciitis  - Refer to physical therapy    Lower Back pain 2/2 DJD  - XR L spine show advanced disc disease and L4-S1  - She has done PT in the past, helped slightly     Thoracic paraspinal pain likely musculoskeletal    Pt will f/u in 4-5 mos    Lobito Whelan MD  11/10/2023  8:20 AM

## 2023-11-10 NOTE — PATIENT INSTRUCTIONS
You were seen today for sjogren  Restart the hydroxychloroquine  See the eye doctor in the next 2 or 3 months  I gave you a referral for physical therapy

## 2023-11-17 ENCOUNTER — TELEPHONE (OUTPATIENT)
Dept: INTERNAL MEDICINE CLINIC | Facility: CLINIC | Age: 43
End: 2023-11-17

## 2023-11-17 NOTE — TELEPHONE ENCOUNTER
Left Message to call back.      Need signature and waist circumference to complete health screening form left in Dr Nigel Palomares

## 2023-11-22 NOTE — TELEPHONE ENCOUNTER
Form was completed and faxed to [facility] provider screening fax number , and confirmed. Left detailed voicemail advising patient this was completed. However patient may call back with further questions or concerns if needed.

## 2023-12-09 ENCOUNTER — HOSPITAL ENCOUNTER (OUTPATIENT)
Dept: MAMMOGRAPHY | Facility: HOSPITAL | Age: 43
Discharge: HOME | End: 2023-12-09
Attending: INTERNAL MEDICINE
Payer: COMMERCIAL

## 2023-12-09 DIAGNOSIS — R92.30 DENSE BREAST: ICD-10-CM

## 2023-12-09 DIAGNOSIS — Z12.31 ENCOUNTER FOR SCREENING MAMMOGRAM FOR MALIGNANT NEOPLASM OF BREAST: ICD-10-CM

## 2023-12-09 PROCEDURE — 77067 SCR MAMMO BI INCL CAD: CPT | Performed by: INTERNAL MEDICINE

## 2023-12-09 PROCEDURE — 77063 BREAST TOMOSYNTHESIS BI: CPT | Performed by: INTERNAL MEDICINE

## 2023-12-13 ENCOUNTER — HOSPITAL ENCOUNTER (OUTPATIENT)
Dept: ULTRASOUND IMAGING | Facility: HOSPITAL | Age: 43
Discharge: HOME | End: 2023-12-13
Attending: INTERNAL MEDICINE
Payer: COMMERCIAL

## 2023-12-13 DIAGNOSIS — N92.0 MENORRHAGIA WITH REGULAR CYCLE: ICD-10-CM

## 2023-12-13 PROCEDURE — 76830 TRANSVAGINAL US NON-OB: CPT | Performed by: INTERNAL MEDICINE

## 2023-12-13 PROCEDURE — 76856 US EXAM PELVIC COMPLETE: CPT | Performed by: INTERNAL MEDICINE

## 2023-12-19 ENCOUNTER — TELEPHONE (OUTPATIENT)
Dept: INTERNAL MEDICINE CLINIC | Facility: CLINIC | Age: 43
End: 2023-12-19

## 2024-01-06 ENCOUNTER — HOSPITAL ENCOUNTER (EMERGENCY)
Facility: HOSPITAL | Age: 44
Discharge: HOME | End: 2024-01-06
Attending: EMERGENCY MEDICINE
Payer: COMMERCIAL

## 2024-01-06 VITALS
SYSTOLIC BLOOD PRESSURE: 129 MMHG | RESPIRATION RATE: 18 BRPM | DIASTOLIC BLOOD PRESSURE: 87 MMHG | WEIGHT: 178 LBS | OXYGEN SATURATION: 96 % | BODY MASS INDEX: 32.76 KG/M2 | TEMPERATURE: 99 F | HEART RATE: 71 BPM | HEIGHT: 62 IN

## 2024-01-06 DIAGNOSIS — G57.32 PERONEAL NERVE PALSY, LEFT: Primary | ICD-10-CM

## 2024-01-06 PROCEDURE — 99282 EMERGENCY DEPT VISIT SF MDM: CPT

## 2024-01-06 RX ORDER — ACETAMINOPHEN 500 MG
1000 TABLET ORAL ONCE
Status: COMPLETED | OUTPATIENT
Start: 2024-01-06 | End: 2024-01-06

## 2024-01-07 NOTE — ED INITIAL ASSESSMENT (HPI)
Pt presents to the ED with c/o left foot numbness for two hours after squatting for a prolonged period of time. Pt able to bear weight on left foot.

## 2024-01-08 NOTE — ED PROVIDER NOTES
Patient Seen in: [facility] Emergency Department    History     Chief Complaint   Patient presents with    Numbness       HPI    Patient presents to the ED complaining of left foot numbness for the past 2 hours.  She states that she was in a squatted position with her legs crossed for a long period of time and felt that her foot was numb for quite some time.  She did notice when she got up the numbness continued and she has had some mild weakness in the foot as well.  She notes some difficulty with dorsiflexion.    History reviewed.   Past Medical History:   Diagnosis Date    Bell's palsy     Essential hypertension     Obesity     Sjogren's syndrome (HCC)     Sjogren's syndrome (HCC) 10/2019    Wears glasses        History reviewed.   Past Surgical History:   Procedure Laterality Date    D & C  ,           TONSILLECTOMY          TUBAL LIGATION           Medications :  (Not in a hospital admission)       Family History   Problem Relation Age of Onset    Diabetes Father             Alcohol and Other Disorders Associated Father     Asthma Mother     Depression Mother     Diabetes Mother 57    Hypertension Mother 33    Obesity Mother     Other (Other) Mother         sleep apnea, lupus, epilepsy    Hypertension Sister     Diabetes Sister     High Cholesterol Brother     Hypertension Maternal Grandmother     Heart Attack Maternal Grandmother 83    Hypertension Maternal Grandfather     Other (ich) Maternal Aunt 45    Heart Attack Maternal Uncle 45    Other (valvular disease) Maternal Aunt     Heart Disease Maternal Uncle 63    Glaucoma Neg     Macular degeneration Neg        Smoking Status:   Social History     Socioeconomic History    Marital status:    Tobacco Use    Smoking status: Former     Packs/day: 0.00     Years: 0.00     Additional pack years: 0.00     Total pack years: 0.00     Types: Cigarettes    Smokeless tobacco: Never    Tobacco comments:     former social smoker  in teenage years   Vaping Use    Vaping Use: Never used   Substance and Sexual Activity    Alcohol use: Not Currently     Alcohol/week: 0.0 standard drinks of alcohol    Drug use: Never       Constitutional and vital signs reviewed.      Social History and Family History elements reviewed from today, pertinent positives to the presenting problem noted.    Physical Exam     ED Triage Vitals [01/06/24 2030]   BP (!) 172/94   Pulse 77   Resp 18   Temp 98.6 °F (37 °C)   Temp src Temporal   SpO2 94 %   O2 Device None (Room air)       All measures to prevent infection transmission during my interaction with the patient were taken. The patient was already wearing a droplet mask on my arrival to the room. Personal protective equipment was worn throughout the duration of the exam.  Handwashing was performed prior to and after the exam.  Stethoscope and any equipment used during my examination was cleaned with super sani-cloth germicidal wipes following the exam.     Physical Exam  Vitals and nursing note reviewed.   Constitutional:       General: She is not in acute distress.     Appearance: She is well-developed.   HENT:      Head: Normocephalic and atraumatic.   Eyes:      General:         Right eye: No discharge.         Left eye: No discharge.      Conjunctiva/sclera: Conjunctivae normal.   Neck:      Trachea: No tracheal deviation.   Cardiovascular:      Rate and Rhythm: Normal rate.      Heart sounds: Normal heart sounds.      Comments: Normal dorsalis pedis pulses bilaterally  Pulmonary:      Effort: Pulmonary effort is normal. No respiratory distress.      Breath sounds: No stridor.   Abdominal:      General: There is no distension.      Palpations: Abdomen is soft.   Musculoskeletal:         General: No deformity.   Skin:     General: Skin is warm and dry.      Comments: Left foot is normal temperature compared to the right and normal color   Neurological:      Mental Status: She is alert and oriented to person,  place, and time.      Comments: Subjective paresthesias to the ankle and dorsal foot area.  Still able to feel touch.  Mild decrease strength with dorsiflexion of the left foot   Psychiatric:         Mood and Affect: Mood normal.         Behavior: Behavior normal.         ED Course      Labs Reviewed - No data to display    As Interpreted by me    Imaging Results Available and Reviewed while in ED: No results found.  ED Medications Administered:   Medications   acetaminophen (Tylenol Extra Strength) tab 1,000 mg (1,000 mg Oral Given 1/6/24 2240)         MDM     Vitals:    01/06/24 2030 01/06/24 2235   BP: (!) 172/94 129/87   Pulse: 77 71   Resp: 18 18   Temp: 98.6 °F (37 °C)    TempSrc: Temporal    SpO2: 94% 96%   Weight: 80.7 kg    Height: 157.5 cm (5' 2\")      *I personally reviewed and interpreted all ED vitals.    Pulse Ox: 96%, Room air, Normal     Differential Diagnosis/ Diagnostic Considerations: Cranial nerve palsy, other    Complicating Factors: The patient already has does not have any pertinent problems on file. to contribute to the complexity of this ED evaluation.    Medical Decision Making  The patient presents to the the patient presents to the ED with symptoms of a likely peroneal nerve palsy.  Paresthesias and mild weakness to the left dorsal foot after squatting with her legs crossed for prolonged time.  Patient otherwise well-appearing on exam and no other abnormalities found on clinical exam.  Recommended supportive care and outpatient neurology follow-up.    Problems Addressed:  Peroneal nerve palsy, left: acute illness or injury        Condition upon leaving the department: Stable    Disposition and Plan     Clinical Impression:  1. Peroneal nerve palsy, left        Disposition:  Discharge    Follow-up:  Chu Griffin DO  [address] [address]  [phone]    Schedule an appointment as soon as possible for a visit  As needed      Medications Prescribed:  Discharge  Medication List as of 1/6/2024 10:42 PM

## 2024-08-07 NOTE — TELEPHONE ENCOUNTER
Please notify patient that I received records from [facility]; her last pap was 2015. I would recommend that she get an updated pap smear as we usually do these every 3 years. Would recommend she schedule for this. Also, how are her blood pressures? chlorhexidine

## 2025-04-07 ENCOUNTER — PATIENT MESSAGE (OUTPATIENT)
Dept: INTERNAL MEDICINE CLINIC | Facility: CLINIC | Age: 45
End: 2025-04-07

## 2025-04-07 RX ORDER — AMLODIPINE BESYLATE 2.5 MG/1
2.5 TABLET ORAL DAILY
Qty: 30 TABLET | Refills: 0 | Status: SHIPPED | OUTPATIENT
Start: 2025-04-07

## 2025-04-07 NOTE — TELEPHONE ENCOUNTER
Phuong message from patient noted. Short-term refill sent (last saw  11/1/2023).    Requested Prescriptions     Signed Prescriptions Disp Refills    amLODIPine 2.5 MG Oral Tab 30 tablet 0     Sig: Take 1 tablet (2.5 mg total) by mouth daily.     Authorizing Provider: JASEN FRANCOIS     Ordering User: CHRISTIE SYED

## 2025-04-29 ENCOUNTER — OFFICE VISIT (OUTPATIENT)
Dept: INTERNAL MEDICINE CLINIC | Facility: CLINIC | Age: 45
End: 2025-04-29

## 2025-04-29 ENCOUNTER — LAB ENCOUNTER (OUTPATIENT)
Dept: LAB | Age: 45
End: 2025-04-29
Attending: INTERNAL MEDICINE
Payer: COMMERCIAL

## 2025-04-29 VITALS
BODY MASS INDEX: 32.76 KG/M2 | HEIGHT: 62 IN | HEART RATE: 79 BPM | TEMPERATURE: 99 F | WEIGHT: 178 LBS | SYSTOLIC BLOOD PRESSURE: 116 MMHG | OXYGEN SATURATION: 97 % | DIASTOLIC BLOOD PRESSURE: 80 MMHG

## 2025-04-29 DIAGNOSIS — R92.30 DENSE BREAST: ICD-10-CM

## 2025-04-29 DIAGNOSIS — Z11.3 SCREENING FOR STD (SEXUALLY TRANSMITTED DISEASE): ICD-10-CM

## 2025-04-29 DIAGNOSIS — Z12.4 SCREENING FOR CERVICAL CANCER: ICD-10-CM

## 2025-04-29 DIAGNOSIS — Z12.31 ENCOUNTER FOR SCREENING MAMMOGRAM FOR MALIGNANT NEOPLASM OF BREAST: ICD-10-CM

## 2025-04-29 DIAGNOSIS — Z00.00 ANNUAL PHYSICAL EXAM: ICD-10-CM

## 2025-04-29 DIAGNOSIS — Z00.00 ANNUAL PHYSICAL EXAM: Primary | ICD-10-CM

## 2025-04-29 LAB
ALBUMIN SERPL-MCNC: 4.3 G/DL (ref 3.2–4.8)
ALBUMIN/GLOB SERPL: 1.2 {RATIO} (ref 1–2)
ALP LIVER SERPL-CCNC: 92 U/L (ref 37–98)
ALT SERPL-CCNC: 14 U/L (ref 10–49)
ANION GAP SERPL CALC-SCNC: 5 MMOL/L (ref 0–18)
AST SERPL-CCNC: 17 U/L (ref ?–34)
BASOPHILS # BLD AUTO: 0.04 X10(3) UL (ref 0–0.2)
BASOPHILS NFR BLD AUTO: 0.6 %
BILIRUB SERPL-MCNC: 0.5 MG/DL (ref 0.3–1.2)
BUN BLD-MCNC: 15 MG/DL (ref 9–23)
BUN/CREAT SERPL: 20.8 (ref 10–20)
CALCIUM BLD-MCNC: 8.7 MG/DL (ref 8.7–10.4)
CHLORIDE SERPL-SCNC: 105 MMOL/L (ref 98–112)
CHOLEST SERPL-MCNC: 131 MG/DL (ref ?–200)
CO2 SERPL-SCNC: 25 MMOL/L (ref 21–32)
CREAT BLD-MCNC: 0.72 MG/DL (ref 0.55–1.02)
DEPRECATED RDW RBC AUTO: 41 FL (ref 35.1–46.3)
EGFRCR SERPLBLD CKD-EPI 2021: 106 ML/MIN/1.73M2 (ref 60–?)
EOSINOPHIL # BLD AUTO: 0.07 X10(3) UL (ref 0–0.7)
EOSINOPHIL NFR BLD AUTO: 1.1 %
ERYTHROCYTE [DISTWIDTH] IN BLOOD BY AUTOMATED COUNT: 13.1 % (ref 11–15)
EST. AVERAGE GLUCOSE BLD GHB EST-MCNC: 105 MG/DL (ref 68–126)
FASTING PATIENT LIPID ANSWER: YES
FASTING STATUS PATIENT QL REPORTED: YES
GLOBULIN PLAS-MCNC: 3.6 G/DL (ref 2–3.5)
GLUCOSE BLD-MCNC: 80 MG/DL (ref 70–99)
HBA1C MFR BLD: 5.3 % (ref ?–5.7)
HCT VFR BLD AUTO: 37.3 % (ref 35–48)
HDLC SERPL-MCNC: 37 MG/DL (ref 40–59)
HGB BLD-MCNC: 12.7 G/DL (ref 12–16)
IMM GRANULOCYTES # BLD AUTO: 0.01 X10(3) UL (ref 0–1)
IMM GRANULOCYTES NFR BLD: 0.2 %
LDLC SERPL CALC-MCNC: 74 MG/DL (ref ?–100)
LYMPHOCYTES # BLD AUTO: 1.46 X10(3) UL (ref 1–4)
LYMPHOCYTES NFR BLD AUTO: 22.6 %
MCH RBC QN AUTO: 29.3 PG (ref 26–34)
MCHC RBC AUTO-ENTMCNC: 34 G/DL (ref 31–37)
MCV RBC AUTO: 85.9 FL (ref 80–100)
MONOCYTES # BLD AUTO: 0.7 X10(3) UL (ref 0.1–1)
MONOCYTES NFR BLD AUTO: 10.9 %
NEUTROPHILS # BLD AUTO: 4.17 X10 (3) UL (ref 1.5–7.7)
NEUTROPHILS # BLD AUTO: 4.17 X10(3) UL (ref 1.5–7.7)
NEUTROPHILS NFR BLD AUTO: 64.6 %
NONHDLC SERPL-MCNC: 94 MG/DL (ref ?–130)
OSMOLALITY SERPL CALC.SUM OF ELEC: 280 MOSM/KG (ref 275–295)
PLATELET # BLD AUTO: 301 10(3)UL (ref 150–450)
POTASSIUM SERPL-SCNC: 3.5 MMOL/L (ref 3.5–5.1)
PROT SERPL-MCNC: 7.9 G/DL (ref 5.7–8.2)
RBC # BLD AUTO: 4.34 X10(6)UL (ref 3.8–5.3)
SODIUM SERPL-SCNC: 135 MMOL/L (ref 136–145)
T PALLIDUM AB SER QL IA: NONREACTIVE
TRIGL SERPL-MCNC: 105 MG/DL (ref 30–149)
TSI SER-ACNC: 1.42 UIU/ML (ref 0.55–4.78)
VLDLC SERPL CALC-MCNC: 16 MG/DL (ref 0–30)
WBC # BLD AUTO: 6.5 X10(3) UL (ref 4–11)

## 2025-04-29 PROCEDURE — 83036 HEMOGLOBIN GLYCOSYLATED A1C: CPT

## 2025-04-29 PROCEDURE — 80061 LIPID PANEL: CPT

## 2025-04-29 PROCEDURE — 87389 HIV-1 AG W/HIV-1&-2 AB AG IA: CPT

## 2025-04-29 PROCEDURE — 85025 COMPLETE CBC W/AUTO DIFF WBC: CPT

## 2025-04-29 PROCEDURE — 80053 COMPREHEN METABOLIC PANEL: CPT

## 2025-04-29 PROCEDURE — 99396 PREV VISIT EST AGE 40-64: CPT | Performed by: INTERNAL MEDICINE

## 2025-04-29 PROCEDURE — 84443 ASSAY THYROID STIM HORMONE: CPT

## 2025-04-29 PROCEDURE — 36415 COLL VENOUS BLD VENIPUNCTURE: CPT

## 2025-04-29 PROCEDURE — 86780 TREPONEMA PALLIDUM: CPT

## 2025-04-29 RX ORDER — AMLODIPINE BESYLATE 2.5 MG/1
2.5 TABLET ORAL DAILY
Qty: 90 TABLET | Refills: 3 | Status: SHIPPED | OUTPATIENT
Start: 2025-04-29

## 2025-04-29 NOTE — PROGRESS NOTES
Medina Virk is a 44 year old female.  Chief Complaint   Patient presents with    Physical     Lower back pain, no injury, will be seeing Rheum in August, Requesting STD screening, reporting cramping/spotting in between periods, last PAP 2023        HPI:   Medina Virk is a 44 year old female who presents for a complete physical exam.      PMH: sjogrens, bells palsy    Sjogrens: diagnosed in 2004; was c/o back pain. Had blood work and MRIs. MRI show herniated disc. Blood work dx with sjogrens.     [place] palsy in 2016.no residual deficits     PSH:  Tonsillectomy  Tubal ligation    Gyne:  Periods are regular  Always normal pap smears  4 children; no complications, all vaginal deliveries.      now; has a new partner.     TODAY:  Having heavy periods; significant cramps and at times clots; did see gyne last year; periods have improve less cramping but having intermittent bleeding    -occasional nipple pain; occasional sharp pain in breast      Went to [employer] works (1/2025); per pt-normal    Wt Readings from Last 6 Encounters:   04/29/25 178 lb (80.7 kg)   01/06/24 178 lb (80.7 kg)   11/10/23 176 lb (79.8 kg)   11/01/23 176 lb (79.8 kg)   08/30/22 160 lb (72.6 kg)   05/11/22 166 lb (75.3 kg)     Body mass index is 32.56 kg/m².       Current Outpatient Medications   Medication Sig Dispense Refill    amLODIPine 2.5 MG Oral Tab Take 1 tablet (2.5 mg total) by mouth daily. 90 tablet 3    hydroxychloroquine 200 MG Oral Tab Take 2 tablets (400 mg total) by mouth daily. 180 tablet 1    Hydroxychloroquine Sulfate 200 MG Oral Tab Take 2 tablets (400 mg total) by mouth daily. 60 tablet 2    CYCLOBENZAPRINE 5 MG Oral Tab TAKE 1 TABLET(5 MG) BY MOUTH EVERY NIGHT AS NEEDED FOR BACK PAIN (Patient not taking: Reported on 4/29/2025) 20 tablet 1      Past Medical History:    Bell's palsy    Essential hypertension    Headache    Obesity    Sjogren's syndrome (HCC)    Sjogren's syndrome (HCC)    Wears glasses      Past Surgical  History:   Procedure Laterality Date    D & c  ,           Tonsillectomy          Tubal ligation        Family History   Problem Relation Age of Onset    Diabetes Father             Alcohol and Other Disorders Associated Father     Asthma Mother     Depression Mother     Diabetes Mother 57    Hypertension Mother 33    Obesity Mother     Other (Other) Mother         sleep apnea, lupus, epilepsy    Hypertension Sister     Diabetes Sister     High Cholesterol Brother     Hypertension Maternal Grandmother     Heart Attack Maternal Grandmother 83    Hypertension Maternal Grandfather     Other (ich) Maternal Aunt 45    Heart Attack Maternal Uncle 45    Other (valvular disease) Maternal Aunt     Heart Disease Maternal Uncle 63    Glaucoma Neg     Macular degeneration Neg       Social History:   Social History     Socioeconomic History    Marital status:    Tobacco Use    Smoking status: Former     Current packs/day: 0.00     Types: Cigarettes    Smokeless tobacco: Never    Tobacco comments:     former social smoker in teenage years   Vaping Use    Vaping status: Never Used   Substance and Sexual Activity    Alcohol use: Not Currently    Drug use: Never          REVIEW OF SYSTEMS:   GENERAL: feels well otherwise  SKIN: denies any unusual skin lesions  EYES:denies blurred vision or double vision  HEENT: denies nasal congestion, sinus pain or ST  LUNGS: denies shortness of breath with exertion or cough  CARDIOVASCULAR: denies chest pain, pressure, or palpitations  GI: denies abdominal pain, nausea, vomiting, diarrhea, constipation, hematochezia, or melena  : no dyspareunia, vaginal discharge. +heavy periods, spotting  NEURO: denies headaches or dizziness    EXAM:   /80   Pulse 79   Temp 98.7 °F (37.1 °C) (Tympanic)   Ht 5' 2\" (1.575 m)   Wt 178 lb (80.7 kg)   SpO2 97%   BMI 32.56 kg/m²     GENERAL: well developed, well nourished, in no apparent distress  HEENT: normal  oropharynx  EYES: PERRLA, EOMI, conjunctivae are pink  NECK: supple, no cervical or supraclavicular LAD, no carotid bruits  BREAST: no dominant or suspicious mass, no axillary LAD  LUNGS: clear to auscultation  CARDIO: RRR, normal S1S2, no gallops or murmurs  GI: soft, NT, ND, NABS, no HSM  Pelvic: normal vagina and cervix without lesion; moderate thin clear discharge. No CMT  EXTREMITIES: no cyanosis, clubbing or edema, +2 DP pulses        ASSESSMENT AND PLAN:     Health maintenance  -mammo order provided  -follow up pap done today  -check fasting labs/std screening  -encouraged exercise  Rec colonoscopy at age 45    Sjogren's +MARLENE, SSA, SSB, smith, +sicca symptoms)  - sees rheum Dr. Mahnaz Kaba  - was previously on Plaquenil 200 mg twice a day   - sx= joint pain, dry eyes and dry mouth  Last eye exam 1/2025    Menorrhagia  Pelvic US 12/2023 with fibroid; continue following with gyne    HTN  Amlodipine 2.5mg daily; controlled    Back pain  Advised yoga and stretches    Lanie Eduardo DO, 04/29/25, 8:11 AM

## 2025-04-30 LAB
C TRACH DNA SPEC QL NAA+PROBE: NEGATIVE
N GONORRHOEA DNA SPEC QL NAA+PROBE: NEGATIVE

## 2025-05-02 LAB — HPV E6+E7 MRNA CVX QL NAA+PROBE: NEGATIVE

## 2025-05-06 ENCOUNTER — TELEPHONE (OUTPATIENT)
Dept: OBGYN CLINIC | Facility: CLINIC | Age: 45
End: 2025-05-06

## 2025-05-06 DIAGNOSIS — N93.9 ABNORMAL UTERINE BLEEDING: Primary | ICD-10-CM

## 2025-05-06 DIAGNOSIS — D25.0 FIBROIDS, SUBMUCOSAL: ICD-10-CM

## 2025-05-07 NOTE — TELEPHONE ENCOUNTER
SURGERY:  schedule hysteroscopy with endometrial ablation     DATE REQUESTED: May 23     Hosp Stay: Out Pt     Major/Minor: Minor     Anticipated time: 20 min     Anesthesia:  MAC     ASSIST NEEDED:  no     PRE-OP WITH PCP: no     DX:  abn bleeding due to submucosal fibroid       Matilde Amezquita MD

## 2025-05-07 NOTE — TELEPHONE ENCOUNTER
I spoke with the patient, confirmed date and time for her procedure. I also sent a minor surgical case letter via [employer]     Surgical case request has been sent     [employer] automated line prior authorization is not needed, reference number [id number]

## 2025-05-22 PROBLEM — D25.0 SUBMUCOUS UTERINE FIBROID: Status: ACTIVE | Noted: 2025-05-22

## 2025-05-22 PROBLEM — N92.1 MENORRHAGIA WITH IRREGULAR CYCLE: Status: ACTIVE | Noted: 2025-05-22

## 2025-05-22 NOTE — H&P
[facility]  part of [facility]        HISTORY AND PHYSICAL        Subjective   Chief Complaint: Abnormal bleeding due to submucosal fibroid.      History of Present Illness:    Medina Virk is a  44 year old y/o  who presents for scheduled gyn procedure hysteroscopy D & C with endometrial ablation.The patients complaints include abnormal bleeding with occasional clots and significant cramping.          Past Medical History[1]    Past Surgical History[2]    OB History    Para Term  AB Living   4 4 0 0 0 0   SAB IAB Ectopic Multiple Live Births   0 0 0 0 0       Allergies[3]    Medications - Current[4]      Family History[5]      REVIEW OF SYSTEMS:   CONSTITUTIONAL: Negative for fever, chills, diaphoresis, weakness, fatigue, weight loss, weight gain.  ALLERGIES: Negative for urticaria, hay fever, angioedema  EYES: Negative for blurry vision, decreased vision, loss of vision, eye pain, diplopia, photophobia, discharge  ENT: Negative for sore throat, nasal congestion, nasal discharge, epistaxis, tinnitus, hearing loss  CARDIOVASCULAR: Negative for chest pain, dyspnea on exertion, orthopnea, paroxysmal nocturnal dyspnea, edema, palpitations  RESPIRATORY: Negative for cough, hemoptysis, shortness of breath, pleuritic chest pain, wheezing  BREAST:  Denies breast mass, breast pain, nipple discharge or nipple pain.  ENDOCRINE: Negative for polydipsia/polyuria, palpitations, skin changes, temperature intolerance, unexpected weight changes  HEME-LYMPH: Negative for swollen lymph nodes, bleeding, bruising  GI: Negative abdominal pain, flank pain, nausea, vomiting, diarrhea, constipation, black stool, blood in stool  : Negative for dysuria, frequency/urgency, hematuria, genital discharge,  NEURO: Negative for dizzy/vertigo, headache, focal weakness, numbness/tingling, speech problems, loss of consciousness, confusion, memory loss  MUSCULOSKELETAL: Negative for back pain, joint pain, joint  stiffness, joint swelling, muscle pain, muscle weakness  SKIN: Negative for rash, itching, hives  PSYCH: Negative for anxiety, depression, physical abuse, sexual abuse      PHYSICAL EXAM:    Ht 5' 2\" (1.575 m)   Wt 171 lb (77.6 kg)   LMP 05/19/2025   BMI 31.28 kg/m²        General   Mental Status - Alert. General Appearance - Cooperative. Orientation - Oriented X4. Build & Nutrition - Well nourished.    Head and Neck  Thyroid   Gland Characteristics - normal size and consistency.    Chest and Lung Exam   Inspection:   Chest Wall: - Normal.  Percussion:   Quality and Intensity: - Percussion normal.  Palpation: - Palpation normal.  Auscultation:   Breath sounds: - Normal.  Adventitious sounds: - No Adventitious sounds.      Cardiovascular   Auscultation: Rhythm - Regular. Heart Sounds - Normal heart sounds.  Murmurs & Other Heart Sounds: Auscultation of the heart reveals - No Murmurs.      Abdomen   Inspection: Inspection of the abdomen reveals - No Hernias. Incisional scars - laparoscopic.  Palpation/Percussion: Palpation and Percussion of the abdomen reveal - Non Tender and No Palpable abdominal masses.  Liver: - Normal.  Auscultation: Auscultation of the abdomen reveals - Bowel sounds normal.      Female Genitourinary     External Genitalia   Perineum - Normal. Bartholin's Gland - Bilateral - Normal. Clitoris - Normal.  Introitus: Characteristics - No Cystocele, Enterocele or Rectocele. Discharge - None.  Labia Majora: Lesions - Bilateral - None. Characteristics - Bilateral - Normal.  Labia Minora: Lesions - Bilateral - None. Characteristics - Bilateral - Normal.  Urethra: Characteristics - Normal. Discharge - None.  [place] Gland - Bilateral - Normal.  Vulva: Characteristics - Normal. Lesions - None.    Speculum & Bimanual   Vagina:   Vaginal Wall: - Normal.  Vaginal Lesions - None. Vaginal Mucosa - Normal.  Cervix: Characteristics - No Motion tenderness. Discharge - None.  Uterus: Characteristics - Normal.  Position - Midposition.  Adnexa: Characteristics - Bilateral - Normal. Masses - No Adnexal Masses.      Peripheral Vascular   Upper Extremity:   Palpation: - Pulses bilaterally normal.  Lower Extremity: Inspection - Bilateral - Inspection Normal.  Palpation: Edema - Bilateral - No edema.      Neurologic   Mental Status: - Normal.      Lymphatic  General Lymphatics   Description - Normal .      Lab Results   Component Value Date    WBC 6.5 2025    HGB 12.7 2025    HCT 37.3 2025    .0 2025    MCV 85.9 2025    RDW 13.1 2025     No components found for: \"ABOGROUP\", \"RHTYPE\", \"RUBIGG\"            Assessment and Plan:      Principal Problem:    Menorrhagia with irregular cycle  Active Problems:    Submucous uterine fibroid        The patient was counseled regarding surgery and the procedure (hysteroscopy D&C  with endometrial ablation) was reviewed at length.   Risks of procedure including bleeding/need for blood transfusion (<1%), infection (5-10%), damage to other organs/bowel/bladder/ureters (<1%),  and anesthesia were reviewed.  Benefits, alternatives, & indications were also discussed.  All questions were answered.  Written information was provided.      Samantha GORE-S       Patient seen and examined, Agree with assessment and plan of care documented by PA student.     Matilde Amezquita MD           [1]   Past Medical History:   Back problem    Bell's palsy    Essential hypertension    Headache    High blood pressure    Obesity    Sjogren's syndrome (HCC)    Sjogren's syndrome (HCC)    Visual impairment    glasses    Wears glasses   [2]   Past Surgical History:  Procedure Laterality Date    D & c  ,           Tonsillectomy      1996    Tubal ligation     [3]   Allergies  Allergen Reactions    Nickel RASH   [4]   Current Outpatient Medications:     amLODIPine 2.5 MG Oral Tab, Take 1 tablet (2.5 mg total) by mouth daily., Disp: 90 tablet, Rfl: 3     hydroxychloroquine 200 MG Oral Tab, Take 2 tablets (400 mg total) by mouth daily., Disp: 180 tablet, Rfl: 1    Hydroxychloroquine Sulfate 200 MG Oral Tab, Take 2 tablets (400 mg total) by mouth daily., Disp: 60 tablet, Rfl: 2    CYCLOBENZAPRINE 5 MG Oral Tab, TAKE 1 TABLET(5 MG) BY MOUTH EVERY NIGHT AS NEEDED FOR BACK PAIN (Patient not taking: Reported on 2025), Disp: 20 tablet, Rfl: 1  [5]   Family History  Problem Relation Age of Onset    Diabetes Father             Alcohol and Other Disorders Associated Father     Asthma Mother     Depression Mother     Diabetes Mother 57    Hypertension Mother 33    Obesity Mother     Other (Other) Mother         sleep apnea, lupus, epilepsy    Hypertension Sister     Diabetes Sister     High Cholesterol Brother     Hypertension Maternal Grandmother     Heart Attack Maternal Grandmother 83    Hypertension Maternal Grandfather     Other (ich) Maternal Aunt 45    Heart Attack Maternal Uncle 45    Other (valvular disease) Maternal Aunt     Heart Disease Maternal Uncle 63    Glaucoma Neg     Macular degeneration Neg

## 2025-05-23 ENCOUNTER — HOSPITAL ENCOUNTER (OUTPATIENT)
Facility: HOSPITAL | Age: 45
Setting detail: HOSPITAL OUTPATIENT SURGERY
Discharge: HOME | End: 2025-05-23
Attending: OBSTETRICS & GYNECOLOGY | Admitting: OBSTETRICS & GYNECOLOGY
Payer: COMMERCIAL

## 2025-05-23 ENCOUNTER — ANESTHESIA (OUTPATIENT)
Dept: SURGERY | Facility: HOSPITAL | Age: 45
End: 2025-05-23
Payer: COMMERCIAL

## 2025-05-23 ENCOUNTER — ANESTHESIA EVENT (OUTPATIENT)
Dept: SURGERY | Facility: HOSPITAL | Age: 45
End: 2025-05-23
Payer: COMMERCIAL

## 2025-05-23 VITALS
WEIGHT: 171 LBS | RESPIRATION RATE: 18 BRPM | HEIGHT: 62 IN | TEMPERATURE: 98 F | SYSTOLIC BLOOD PRESSURE: 111 MMHG | OXYGEN SATURATION: 94 % | BODY MASS INDEX: 31.47 KG/M2 | DIASTOLIC BLOOD PRESSURE: 70 MMHG | HEART RATE: 73 BPM

## 2025-05-23 DIAGNOSIS — D25.0 FIBROIDS, SUBMUCOSAL: ICD-10-CM

## 2025-05-23 DIAGNOSIS — N93.9 ABNORMAL UTERINE BLEEDING: ICD-10-CM

## 2025-05-23 LAB — B-HCG UR QL: NEGATIVE

## 2025-05-23 PROCEDURE — 58561 HYSTEROSCOPY REMOVE MYOMA: CPT | Performed by: OBSTETRICS & GYNECOLOGY

## 2025-05-23 PROCEDURE — 58563 HYSTEROSCOPY ABLATION: CPT | Performed by: OBSTETRICS & GYNECOLOGY

## 2025-05-23 RX ORDER — NALOXONE HYDROCHLORIDE 0.4 MG/ML
0.08 INJECTION, SOLUTION INTRAMUSCULAR; INTRAVENOUS; SUBCUTANEOUS AS NEEDED
Status: DISCONTINUED | OUTPATIENT
Start: 2025-05-23 | End: 2025-05-23

## 2025-05-23 RX ORDER — MORPHINE SULFATE 4 MG/ML
4 INJECTION, SOLUTION INTRAMUSCULAR; INTRAVENOUS EVERY 10 MIN PRN
Status: DISCONTINUED | OUTPATIENT
Start: 2025-05-23 | End: 2025-05-23

## 2025-05-23 RX ORDER — FAMOTIDINE 10 MG/ML
20 INJECTION, SOLUTION INTRAVENOUS ONCE
Status: COMPLETED | OUTPATIENT
Start: 2025-05-23 | End: 2025-05-23

## 2025-05-23 RX ORDER — MORPHINE SULFATE 10 MG/ML
6 INJECTION, SOLUTION INTRAMUSCULAR; INTRAVENOUS EVERY 10 MIN PRN
Status: DISCONTINUED | OUTPATIENT
Start: 2025-05-23 | End: 2025-05-23

## 2025-05-23 RX ORDER — HYDROMORPHONE HYDROCHLORIDE 1 MG/ML
0.6 INJECTION, SOLUTION INTRAMUSCULAR; INTRAVENOUS; SUBCUTANEOUS EVERY 5 MIN PRN
Status: DISCONTINUED | OUTPATIENT
Start: 2025-05-23 | End: 2025-05-23

## 2025-05-23 RX ORDER — METOCLOPRAMIDE HYDROCHLORIDE 5 MG/ML
10 INJECTION INTRAMUSCULAR; INTRAVENOUS ONCE
Status: COMPLETED | OUTPATIENT
Start: 2025-05-23 | End: 2025-05-23

## 2025-05-23 RX ORDER — SODIUM CHLORIDE, SODIUM LACTATE, POTASSIUM CHLORIDE, CALCIUM CHLORIDE 600; 310; 30; 20 MG/100ML; MG/100ML; MG/100ML; MG/100ML
INJECTION, SOLUTION INTRAVENOUS CONTINUOUS
Status: DISCONTINUED | OUTPATIENT
Start: 2025-05-23 | End: 2025-05-23

## 2025-05-23 RX ORDER — MORPHINE SULFATE 4 MG/ML
2 INJECTION, SOLUTION INTRAMUSCULAR; INTRAVENOUS EVERY 10 MIN PRN
Status: DISCONTINUED | OUTPATIENT
Start: 2025-05-23 | End: 2025-05-23

## 2025-05-23 RX ORDER — HYDROMORPHONE HYDROCHLORIDE 1 MG/ML
0.4 INJECTION, SOLUTION INTRAMUSCULAR; INTRAVENOUS; SUBCUTANEOUS EVERY 5 MIN PRN
Status: DISCONTINUED | OUTPATIENT
Start: 2025-05-23 | End: 2025-05-23

## 2025-05-23 RX ORDER — ACETAMINOPHEN 500 MG
1000 TABLET ORAL ONCE
Status: COMPLETED | OUTPATIENT
Start: 2025-05-23 | End: 2025-05-23

## 2025-05-23 RX ORDER — KETOROLAC TROMETHAMINE 30 MG/ML
INJECTION, SOLUTION INTRAMUSCULAR; INTRAVENOUS AS NEEDED
Status: DISCONTINUED | OUTPATIENT
Start: 2025-05-23 | End: 2025-05-23 | Stop reason: SURG

## 2025-05-23 RX ORDER — MIDAZOLAM HYDROCHLORIDE 1 MG/ML
INJECTION INTRAMUSCULAR; INTRAVENOUS AS NEEDED
Status: DISCONTINUED | OUTPATIENT
Start: 2025-05-23 | End: 2025-05-23 | Stop reason: SURG

## 2025-05-23 RX ORDER — FAMOTIDINE 20 MG/1
20 TABLET, FILM COATED ORAL ONCE
Status: COMPLETED | OUTPATIENT
Start: 2025-05-23 | End: 2025-05-23

## 2025-05-23 RX ORDER — METOCLOPRAMIDE 10 MG/1
10 TABLET ORAL ONCE
Status: COMPLETED | OUTPATIENT
Start: 2025-05-23 | End: 2025-05-23

## 2025-05-23 RX ORDER — HYDROMORPHONE HYDROCHLORIDE 1 MG/ML
0.2 INJECTION, SOLUTION INTRAMUSCULAR; INTRAVENOUS; SUBCUTANEOUS EVERY 5 MIN PRN
Status: DISCONTINUED | OUTPATIENT
Start: 2025-05-23 | End: 2025-05-23

## 2025-05-23 RX ADMIN — KETOROLAC TROMETHAMINE 30 MG: 30 INJECTION, SOLUTION INTRAMUSCULAR; INTRAVENOUS at 08:04:00

## 2025-05-23 RX ADMIN — SODIUM CHLORIDE, SODIUM LACTATE, POTASSIUM CHLORIDE, CALCIUM CHLORIDE: 600; 310; 30; 20 INJECTION, SOLUTION INTRAVENOUS at 08:19:00

## 2025-05-23 RX ADMIN — MIDAZOLAM HYDROCHLORIDE 2 MG: 1 INJECTION INTRAMUSCULAR; INTRAVENOUS at 07:32:00

## 2025-05-23 RX ADMIN — SODIUM CHLORIDE, SODIUM LACTATE, POTASSIUM CHLORIDE, CALCIUM CHLORIDE: 600; 310; 30; 20 INJECTION, SOLUTION INTRAVENOUS at 07:28:00

## 2025-05-23 NOTE — DISCHARGE INSTRUCTIONS
What happens after hysteroscopy?  You may have cramps and bleeding for short time after the procedure. This is normal. Use pads instead of tampons.  Don't douche or use tampons until your healthcare provider says it’s OK.  Don't use any vaginal medicines until you are told it’s OK.  Ask your healthcare provider when it’s OK to have sex again.    When to call your healthcare provider  Call your healthcare provider if any of the following occur:  Heavy bleeding (more than 1 pad an hour for 2 or more hours)  A fever of 100.4°F ( 38.0°C) or higher, or as directed by your provider  Increasing belly (abdominal) pain or soreness  Bad-smelling discharge     HOME INSTRUCTIONS  [facility] HOME CARE INSTRUCTIONS: POST-OP ANESTHESIA  The medication that you received for sedation or general anesthesia can last up to 24 hours. Your judgment and reflexes may be altered, even if you feel like your normal self.      We Recommend:   Do not drive any motor vehicle or bicycle   Avoid mowing the lawn, playing sports, or working with power tools/applicances (power saws, electric knives or mixers)   That you have someone stay with you on your first night home   Do not drink alcohol or take sleeping pills or tranquilizers   Do not sign legal documents within 24 hours of your procedure   If you had a nerve block for your surgery, take extra care not to put any pressure on your arm or hand for 24 hours    It is normal:  For you to have a sore throat if you had a breathing tube during surgery (while you were asleep!). The sore throat should get better within 48 hours. You can gargle with warm salt water (1/2 tsp in 4 oz warm water) or use a throat lozenge for comfort  To feel muscle aches or soreness especially in the abdomen, chest or neck. The achy feeling should go away in the next 24 hours  To feel weak, sleepy or \"wiped out\". Your should start feeling better in the next 24 hours.   To experience mild discomforts such as sore lip or tongue,  headache, cramps, gas pains or a bloated feeling in your abdomen.   To experience mild back pain or soreness for a day or two if you had spinal or epidural anesthesia.   If you had laparoscopic surgery, to feel shoulder pain or discomfort on the day of surgery.   For some patients to have nausea after surgery/anesthesia    If you feel nausea or experience vomiting:   Try to move around less.   Eat less than usual or drink only liquids until the next morning   Nausea should resolve in about 24 hours    If you have a problem when you are at home:    Call your surgeons office +      Discharge Instructions: After Your Surgery  You’ve just had surgery. During surgery, you were given medicine called anesthesia to keep you relaxed and free of pain. After surgery, you may have some pain or nausea. This is common. Here are some tips for feeling better and getting well after surgery.   Going home  Your healthcare provider will show you how to take care of yourself when you go home. They'll also answer your questions. Have an adult family member or friend drive you home. For the first 24 hours after your surgery:   Don't drive or use heavy equipment.  Don't make important decisions or sign legal papers.  Take medicines as directed.  Don't drink alcohol.  Have someone stay with you, if needed. They can watch for problems and help keep you safe.  Be sure to go to all follow-up visits with your healthcare provider. And rest after your surgery for as long as your provider tells you to.   Coping with pain  If you have pain after surgery, pain medicine will help you feel better. Take it as directed, before pain becomes severe. Also, ask your healthcare provider or pharmacist about other ways to control pain. This might be with heat, ice, or relaxation. And follow any other instructions your surgeon or nurse gives you.      Stay on schedule with your medicine.     Tips for taking pain medicine  To get the best relief possible,  remember these points:   Pain medicines can upset your stomach. Taking them with a little food may help.  Most pain relievers taken by mouth need at least 20 to 30 minutes to start to work.  Don't wait till your pain becomes severe before you take your medicine. Try to time your medicine so that you can take it before starting an activity. This might be before you get dressed, go for a walk, or sit down for dinner.  Constipation is a common side effect of some pain medicines. Call your healthcare provider before taking any medicines such as laxatives or stool softeners to help ease constipation. Also ask if you should skip any foods. Drinking lots of fluids and eating foods such as fruits and vegetables that are high in fiber can also help. Remember, don't take laxatives unless your surgeon has prescribed them.  Drinking alcohol and taking pain medicine can cause dizziness and slow your breathing. It can even be deadly. Don't drink alcohol while taking pain medicine.  Pain medicine can make you react more slowly to things. Don't drive or run machinery while taking pain medicine.  Your healthcare provider may tell you to take acetaminophen to help ease your pain. Ask them how much you're supposed to take each day. Acetaminophen or other pain relievers may interact with your prescription medicines or other over-the-counter (OTC) medicines. Some prescription medicines have acetaminophen and other ingredients in them. Using both prescription and OTC acetaminophen for pain can cause you to accidentally overdose. Read the labels on your OTC medicines with care. This will help you to clearly know the list of ingredients, how much to take, and any warnings. It may also help you not take too much acetaminophen. If you have questions or don't understand the information, ask your pharmacist or healthcare provider to explain it to you before you take the OTC medicine.   Managing nausea  Some people have an upset stomach  (nausea) after surgery. This is often because of anesthesia, pain, or pain medicine, less movement of food in the stomach, or the stress of surgery. These tips will help you handle nausea and eat healthy foods as you get better. If you were on a special food plan before surgery, ask your healthcare provider if you should follow it while you get better. Check with your provider on how your eating should progress. It may depend on the surgery you had. These general tips may help:   Don't push yourself to eat. Your body will tell you when to eat and how much.  Start off with clear liquids and soup. They're easier to digest.  Next try semi-solid foods as you feel ready. These include mashed potatoes, applesauce, and gelatin.  Slowly move to solid foods. Don’t eat fatty, rich, or spicy foods at first.  Don't force yourself to have 3 large meals a day. Instead eat smaller amounts more often.  Take pain medicines with a small amount of solid food, such as crackers or toast. This helps prevent nausea.  When to call your healthcare provider  Call your healthcare provider right away if you have any of these:   You still have too much pain, or the pain gets worse, after taking the medicine. The medicine may not be strong enough. Or there may be a complication from the surgery.  You feel too sleepy, dizzy, or groggy. The medicine may be too strong.  Side effects such as nausea or vomiting. Your healthcare provider may advise taking other medicines to .  Skin changes such as rash, itching, or hives. This may mean you have an allergic reaction. Your provider may advise taking other medicines.  The incision looks different (for instance, part of it opens up).  Bleeding or fluid leaking from the incision site, and weren't told to expect that.  Fever of 100.4°F (38°C) or higher, or as directed by your provider.  Call 911  Call 911 right away if you have:   Trouble breathing  Facial swelling    If you have obstructive sleep apnea    You were given anesthesia medicine during surgery to keep you comfortable and free of pain. After surgery, you may have more apnea spells because of this medicine and other medicines you were given. The spells may last longer than normal.    At home:  Keep using the continuous positive airway pressure (CPAP) device when you sleep. Unless your healthcare provider tells you not to, use it when you sleep, day or night. CPAP is a common device used to treat obstructive sleep apnea.  Talk with your provider before taking any pain medicine, muscle relaxants, or sedatives. Your provider will tell you about the possible dangers of taking these medicines.  Contact your provider if your sleeping changes a lot even when taking medicines as directed.  [employer] last reviewed this educational content on 10/1/2021  © 4583-6740 [employer]. All rights reserved. This information is not intended as a substitute for professional medical care. Always follow your healthcare professional's instructions.

## 2025-05-23 NOTE — ANESTHESIA POSTPROCEDURE EVALUATION
Patient: Medina Virk    Procedure Summary       Date: 05/23/25 Room / Location: [facility] MAIN OR  / [facility] MAIN OR    Anesthesia Start: 0728 Anesthesia Stop: 0819    Procedure: Hysteroscopic myomectomy, novasure endometrial ablation (Uterus) Diagnosis:       Abnormal uterine bleeding      Fibroids, submucosal      (Abnormal uterine bleeding [N93.9]Fibroids, submucosal [D25.0])    Surgeons: Matilde Amezquita MD Anesthesiologist: Vikash Rivas MD    Anesthesia Type: general ASA Status: 2            Anesthesia Type: general    Vitals Value Taken Time   /63 05/23/25 08:14   Temp 97.2 °F (36.2 °C) 05/23/25 08:12   Pulse 84 05/23/25 08:19   Resp 15 05/23/25 08:19   SpO2 92 % 05/23/25 08:19   Vitals shown include unfiled device data.    [facility] AN Post Evaluation:   Patient Evaluated in PACU  Patient Participation: waiting for patient participation  Level of Consciousness: awake  Pain Score: 0  Pain Management: adequate  Airway Patency:patent  Yes    Nausea/Vomiting: none  Cardiovascular Status: acceptable  Respiratory Status: acceptable  Postoperative Hydration acceptable      Vikash Rivas MD  5/23/2025 8:19 AM

## 2025-05-23 NOTE — OPERATIVE REPORT
[facility] POST ANESTHESIA CARE UNIT  Operative Note     Medina Virk Location: OR   CSN [id number] MRN [id number]   Admission Date 5/23/2025 Operation Date 5/23/2025   Attending Physician Matilde Amezquita MD Operating Physician Matilde Amezquita MD      Preoperative Diagnosis: Abnormal uterine bleeding [N93.9]  Fibroids, submucosal [D25.0]     Postoperative Diagnosis: Abnormal uterine bleeding [N93.9]Fibroids, submucosal [D25.0]     Procedure Performed:   Hysteroscopic myomectomy, novasure endometrial ablation     Primary Surgeon: Matilde Amezquita MD      Assistant: NONE      Surgical Findings: large posterior wall submucosal fibroid      Anesthesia: MAC     Complications: none     Implants: * No implants in log *     Specimen: endometrial curetting and submucosal fibroid      Drains: none      Condition: stable      Estimated Blood Loss: 20 ml      Summary of Case: After obtaining consent, the patient was taken to the  operative suite where she was administered general endotracheal  anesthesia.  The patient was then placed in dorsal lithotomy in [place]  stirrups.  Speculum was performed.  The cervix was then serially  dilated.  Hysteroscope was introduced and a large posterior wall submucosal fibroid was identified.  At this time,  the cervix was then dilated to a 9.5 and the large operative  hysteroscope with morcellator was then introduced.  The submucosal  fibroid was then morcellated under direct visualization using the myosure morcellator.  All tissue was sent for pathologic evaluation.  This was continued down to the base of the endometrium.  The  hysteroscope was then removed and a sharp curette was performed.  All  tissue was sent for pathologic evaluation.    The NovaSure device was then inspected and in good working order.  Uterine cavity length was noted to be 6.0 cm and the NovaSure device was  introduced and the uterine cavity width was measured at 4.5 cm.  Perforation test was performed and  normal.  An endometrial ablation was  then performed for the full cycle.     The device was then removed intact.    Patient tolerated procedure well.  She was transferred to the recovery  room in stable condition.  Blood loss was estimated at 20  mL and there  were no immediate postoperative complications.      Matilde Amezquita MD  5/23/2025  8:43 AM

## 2025-05-23 NOTE — INTERVAL H&P NOTE
Pre-op Diagnosis: Abnormal uterine bleeding [N93.9]  Fibroids, submucosal [D25.0]    The above referenced H&P was reviewed by Matilde Amezquita MD on 5/23/2025, the patient was examined and no significant changes have occurred in the patient's condition since the H&P was performed.  I discussed with the patient and/or legal representative the potential benefits, risks and side effects of this procedure; the likelihood of the patient achieving goals; and potential problems that might occur during recuperation.  I discussed reasonable alternatives to the procedure, including risks, benefits and side effects related to the alternatives and risks related to not receiving this procedure.  We will proceed with procedure as planned.

## 2025-05-23 NOTE — ANESTHESIA PREPROCEDURE EVALUATION
Anesthesia PreOp Note    HPI:     Medina Virk is a 44 year old female who presents for preoperative consultation requested by: Matilde Amezquita MD    Date of Surgery: 5/23/2025    Procedure(s):  Hysteroscopy with novasure, endometrial ablation  Indication: Abnormal uterine bleeding [N93.9]  Fibroids, submucosal [D25.0]    Relevant Problems   No relevant active problems       NPO:  Last Liquid Consumption Date: 05/23/25  Last Liquid Consumption Time: 0545  Last Solid Consumption Date: 05/22/25  Last Solid Consumption Time: 2000  Last Liquid Consumption Date: 05/23/25          History Review:  Patient Active Problem List    Diagnosis Date Noted    Menorrhagia with irregular cycle 05/22/2025    Submucous uterine fibroid 05/22/2025    Elevated alkaline phosphatase level 05/05/2021    Vitamin D deficiency 05/05/2021    Long-term use of Plaquenil 12/22/2020    Myopia of both eyes 12/22/2020    Floater, vitreous, bilateral 12/22/2020    Cupping of the optic disc not due to glaucoma 12/22/2020    Sjogren's syndrome with keratoconjunctivitis sicca (HCC) 09/20/2020    Essential hypertension 09/20/2020       Past Medical History[1]    Past Surgical History[2]    Prescriptions Prior to Admission[3]  Current Medications and Prescriptions Ordered in Epic[4]    Allergies[5]    Family History[6]  Social Hx on file[7]    Available pre-op labs reviewed.  Lab Results   Component Value Date    WBC 6.5 04/29/2025    RBC 4.34 04/29/2025    HGB 12.7 04/29/2025    HCT 37.3 04/29/2025    MCV 85.9 04/29/2025    MCH 29.3 04/29/2025    MCHC 34.0 04/29/2025    RDW 13.1 04/29/2025    .0 04/29/2025    URINEPREG Negative 05/23/2025     Lab Results   Component Value Date     (L) 04/29/2025    K 3.5 04/29/2025     04/29/2025    CO2 25.0 04/29/2025    BUN 15 04/29/2025    CREATSERUM 0.72 04/29/2025    GLU 80 04/29/2025    CA 8.7 04/29/2025          Vital Signs:  Body mass index is 31.28 kg/m².   height is 1.575 m (5' 2\") and  weight is 77.6 kg (171 lb). Her oral temperature is 98.3 °F (36.8 °C). Her blood pressure is 142/90 and her pulse is 79. Her respiration is 15 and oxygen saturation is 98%.   Vitals:    25 0805 25 0631   BP:  142/90   Pulse:  79   Resp:  15   Temp:  98.3 °F (36.8 °C)   TempSrc:  Oral   SpO2:  98%   Weight: 77.6 kg (171 lb) 77.6 kg (171 lb)   Height: 1.575 m (5' 2\") 1.575 m (5' 2\")        Anesthesia Evaluation     Patient summary reviewed    Airway   Mallampati: II  TM distance: >3 FB  Neck ROM: full  Dental      Pulmonary - normal exam   Cardiovascular   (+) hypertension    Neuro/Psych    (+)  neuromuscular disease,        GI/Hepatic/Renal      Endo/Other    Abdominal  - normal exam                 Anesthesia Plan:   ASA:  2  Plan:   General  Monitors and Lines:   BIS  Airway:  LMA      I have informed Medina Virk and/or legal guardian or family member of the nature of the anesthetic plan, benefits, risks including possible dental damage if relevant, major complications, and any alternative forms of anesthetic management.   All of the patient's questions were answered to the best of my ability. The patient desires the anesthetic management as planned.  Vikash Rivas MD  2025 6:58 AM  Present on Admission:   Submucous uterine fibroid           [1]   Past Medical History:   Back problem    Bell's palsy    Essential hypertension    Headache    High blood pressure    Obesity    Sjogren's syndrome (HCC)    Sjogren's syndrome (HCC)    Visual impairment    glasses    Wears glasses   [2]   Past Surgical History:  Procedure Laterality Date    D & c  ,           Tonsillectomy          Tubal ligation     [3]   Medications Prior to Admission   Medication Sig Dispense Refill Last Dose/Taking    amLODIPine 2.5 MG Oral Tab Take 1 tablet (2.5 mg total) by mouth daily. 90 tablet 3 2025 at  5:45 AM    hydroxychloroquine 200 MG Oral Tab Take 2 tablets (400 mg total) by mouth daily. (Patient not  taking: Reported on 2025) 180 tablet 1 Not Taking    Hydroxychloroquine Sulfate 200 MG Oral Tab Take 2 tablets (400 mg total) by mouth daily. 60 tablet 2     CYCLOBENZAPRINE 5 MG Oral Tab TAKE 1 TABLET(5 MG) BY MOUTH EVERY NIGHT AS NEEDED FOR BACK PAIN (Patient not taking: Reported on 2025) 20 tablet 1    [4]   Current Facility-Administered Medications Ordered in Epic   Medication Dose Route Frequency Provider Last Rate Last Admin    lactated ringers infusion   Intravenous Continuous Matilde Amezquita MD         No current [facility]-ordered outpatient medications on file.   [5]   Allergies  Allergen Reactions    Nickel RASH   [6]   Family History  Problem Relation Age of Onset    Diabetes Father             Alcohol and Other Disorders Associated Father     Asthma Mother     Depression Mother     Diabetes Mother 57    Hypertension Mother 33    Obesity Mother     Other (Other) Mother         sleep apnea, lupus, epilepsy    Hypertension Sister     Diabetes Sister     High Cholesterol Brother     Hypertension Maternal Grandmother     Heart Attack Maternal Grandmother 83    Hypertension Maternal Grandfather     Other (ich) Maternal Aunt 45    Heart Attack Maternal Uncle 45    Other (valvular disease) Maternal Aunt     Heart Disease Maternal Uncle 63    Glaucoma Neg     Macular degeneration Neg    [7]   Social History  Socioeconomic History    Marital status:    Tobacco Use    Smoking status: Former     Current packs/day: 0.00     Types: Cigarettes    Smokeless tobacco: Never    Tobacco comments:     former social smoker in teenage years   Vaping Use    Vaping status: Never Used   Substance and Sexual Activity    Alcohol use: Not Currently    Drug use: Never

## 2025-06-28 ENCOUNTER — HOSPITAL ENCOUNTER (OUTPATIENT)
Dept: MAMMOGRAPHY | Facility: HOSPITAL | Age: 45
Discharge: HOME | End: 2025-06-28
Attending: INTERNAL MEDICINE
Payer: COMMERCIAL

## 2025-06-28 DIAGNOSIS — R92.30 DENSE BREAST: ICD-10-CM

## 2025-06-28 DIAGNOSIS — Z12.31 ENCOUNTER FOR SCREENING MAMMOGRAM FOR MALIGNANT NEOPLASM OF BREAST: ICD-10-CM

## 2025-06-28 PROCEDURE — 77067 SCR MAMMO BI INCL CAD: CPT | Performed by: INTERNAL MEDICINE

## 2025-06-28 PROCEDURE — 77063 BREAST TOMOSYNTHESIS BI: CPT | Performed by: INTERNAL MEDICINE

## 2025-08-06 ENCOUNTER — OFFICE VISIT (OUTPATIENT)
Age: 45
End: 2025-08-06

## 2025-08-06 VITALS
HEIGHT: 62 IN | WEIGHT: 165 LBS | BODY MASS INDEX: 30.36 KG/M2 | DIASTOLIC BLOOD PRESSURE: 80 MMHG | SYSTOLIC BLOOD PRESSURE: 119 MMHG

## 2025-08-06 DIAGNOSIS — G89.29 CHRONIC LEFT SHOULDER PAIN: ICD-10-CM

## 2025-08-06 DIAGNOSIS — M35.01 SJOGREN'S SYNDROME WITH KERATOCONJUNCTIVITIS SICCA (HCC): Primary | ICD-10-CM

## 2025-08-06 DIAGNOSIS — M25.512 CHRONIC LEFT SHOULDER PAIN: ICD-10-CM

## 2025-08-06 PROCEDURE — 99214 OFFICE O/P EST MOD 30 MIN: CPT | Performed by: INTERNAL MEDICINE

## 2025-08-18 RX ORDER — NORGESTIMATE AND ETHINYL ESTRADIOL 0.25-0.035
1 KIT ORAL DAILY
Qty: 84 TABLET | Refills: 5 | Status: SHIPPED | OUTPATIENT
Start: 2025-08-18

## 2025-08-20 ENCOUNTER — OFFICE VISIT (OUTPATIENT)
Dept: INTERNAL MEDICINE CLINIC | Facility: CLINIC | Age: 45
End: 2025-08-20

## 2025-08-20 DIAGNOSIS — Z11.3 SCREENING FOR STD (SEXUALLY TRANSMITTED DISEASE): ICD-10-CM

## 2025-08-20 DIAGNOSIS — N89.8 VAGINAL DISCHARGE: Primary | ICD-10-CM

## 2025-08-20 PROCEDURE — 81514 NFCT DS BV&VAGINITIS DNA ALG: CPT

## 2025-08-21 ENCOUNTER — LAB ENCOUNTER (OUTPATIENT)
Dept: LAB | Age: 45
End: 2025-08-21

## 2025-08-21 DIAGNOSIS — Z11.3 SCREENING FOR STD (SEXUALLY TRANSMITTED DISEASE): ICD-10-CM

## 2025-08-21 LAB
BV BACTERIA DNA VAG QL NAA+PROBE: POSITIVE
C GLABRATA DNA VAG QL NAA+PROBE: NEGATIVE
C KRUSEI DNA VAG QL NAA+PROBE: NEGATIVE
CANDIDA DNA VAG QL NAA+PROBE: NEGATIVE
HBV SURFACE AG SER-ACNC: <0.1
HBV SURFACE AG SERPL QL IA: NONREACTIVE
HCV AB SERPL QL IA: NONREACTIVE
T PALLIDUM AB SER QL IA: NONREACTIVE
T VAGINALIS DNA VAG QL NAA+PROBE: NEGATIVE

## 2025-08-21 PROCEDURE — 87801 DETECT AGNT MULT DNA AMPLI: CPT

## 2025-08-21 PROCEDURE — 87591 N.GONORRHOEAE DNA AMP PROB: CPT

## 2025-08-21 PROCEDURE — 87340 HEPATITIS B SURFACE AG IA: CPT

## 2025-08-21 PROCEDURE — 87389 HIV-1 AG W/HIV-1&-2 AB AG IA: CPT

## 2025-08-21 PROCEDURE — 86780 TREPONEMA PALLIDUM: CPT

## 2025-08-21 PROCEDURE — 87491 CHLMYD TRACH DNA AMP PROBE: CPT

## 2025-08-21 PROCEDURE — 86803 HEPATITIS C AB TEST: CPT

## 2025-08-21 PROCEDURE — 36415 COLL VENOUS BLD VENIPUNCTURE: CPT

## 2025-08-22 LAB
C TRACH DNA SPEC QL NAA+PROBE: NEGATIVE
N GONORRHOEA DNA SPEC QL NAA+PROBE: NEGATIVE

## 2025-08-26 ENCOUNTER — HOSPITAL ENCOUNTER (EMERGENCY)
Facility: HOSPITAL | Age: 45
Discharge: HOME | End: 2025-08-26
Attending: EMERGENCY MEDICINE

## 2025-08-26 ENCOUNTER — NURSE TRIAGE (OUTPATIENT)
Dept: INTERNAL MEDICINE CLINIC | Facility: CLINIC | Age: 45
End: 2025-08-26

## 2025-08-26 ENCOUNTER — APPOINTMENT (OUTPATIENT)
Dept: ULTRASOUND IMAGING | Facility: HOSPITAL | Age: 45
End: 2025-08-26
Attending: EMERGENCY MEDICINE

## 2025-08-26 VITALS
OXYGEN SATURATION: 98 % | HEART RATE: 67 BPM | SYSTOLIC BLOOD PRESSURE: 147 MMHG | DIASTOLIC BLOOD PRESSURE: 93 MMHG | TEMPERATURE: 99 F | RESPIRATION RATE: 18 BRPM

## 2025-08-26 DIAGNOSIS — N76.0 BACTERIAL VAGINOSIS: ICD-10-CM

## 2025-08-26 DIAGNOSIS — R20.2 PARESTHESIAS: Primary | ICD-10-CM

## 2025-08-26 DIAGNOSIS — B96.89 BACTERIAL VAGINOSIS: ICD-10-CM

## 2025-08-26 DIAGNOSIS — R11.2 NAUSEA AND VOMITING IN ADULT: ICD-10-CM

## 2025-08-26 LAB
ALBUMIN SERPL-MCNC: 4.4 G/DL (ref 3.2–4.8)
ALBUMIN/GLOB SERPL: 1.3 (ref 1–2)
ALP LIVER SERPL-CCNC: 67 U/L (ref 37–98)
ALT SERPL-CCNC: 10 U/L (ref 10–49)
ANION GAP SERPL CALC-SCNC: 6 MMOL/L (ref 0–18)
AST SERPL-CCNC: 14 U/L (ref ?–34)
BASOPHILS # BLD AUTO: 0.03 X10(3) UL (ref 0–0.2)
BASOPHILS NFR BLD AUTO: 0.4 %
BILIRUB SERPL-MCNC: 0.3 MG/DL (ref 0.3–1.2)
BUN BLD-MCNC: 12 MG/DL (ref 9–23)
BUN/CREAT SERPL: 16.9 (ref 10–20)
CALCIUM BLD-MCNC: 8.9 MG/DL (ref 8.7–10.4)
CHLORIDE SERPL-SCNC: 106 MMOL/L (ref 98–112)
CO2 SERPL-SCNC: 27 MMOL/L (ref 21–32)
CREAT BLD-MCNC: 0.71 MG/DL (ref 0.55–1.02)
DEPRECATED RDW RBC AUTO: 39.5 FL (ref 35.1–46.3)
EGFRCR SERPLBLD CKD-EPI 2021: 107 ML/MIN/1.73M2 (ref 60–?)
EOSINOPHIL # BLD AUTO: 0.05 X10(3) UL (ref 0–0.7)
EOSINOPHIL NFR BLD AUTO: 0.6 %
ERYTHROCYTE [DISTWIDTH] IN BLOOD BY AUTOMATED COUNT: 12.6 % (ref 11–15)
GLOBULIN PLAS-MCNC: 3.4 G/DL (ref 2–3.5)
GLUCOSE BLD-MCNC: 85 MG/DL (ref 70–99)
HCT VFR BLD AUTO: 33.8 % (ref 35–48)
HGB BLD-MCNC: 11.8 G/DL (ref 12–16)
IMM GRANULOCYTES # BLD AUTO: 0.02 X10(3) UL (ref 0–1)
IMM GRANULOCYTES NFR BLD: 0.3 %
LYMPHOCYTES # BLD AUTO: 2.08 X10(3) UL (ref 1–4)
LYMPHOCYTES NFR BLD AUTO: 26.2 %
MAGNESIUM SERPL-MCNC: 2 MG/DL (ref 1.6–2.6)
MCH RBC QN AUTO: 30.2 PG (ref 26–34)
MCHC RBC AUTO-ENTMCNC: 34.9 G/DL (ref 31–37)
MCV RBC AUTO: 86.4 FL (ref 80–100)
MONOCYTES # BLD AUTO: 0.66 X10(3) UL (ref 0.1–1)
MONOCYTES NFR BLD AUTO: 8.3 %
NEUTROPHILS # BLD AUTO: 5.1 X10 (3) UL (ref 1.5–7.7)
NEUTROPHILS # BLD AUTO: 5.1 X10(3) UL (ref 1.5–7.7)
NEUTROPHILS NFR BLD AUTO: 64.2 %
OSMOLALITY SERPL CALC.SUM OF ELEC: 287 MOSM/KG (ref 275–295)
PLATELET # BLD AUTO: 338 10(3)UL (ref 150–450)
POTASSIUM SERPL-SCNC: 3.3 MMOL/L (ref 3.5–5.1)
PROT SERPL-MCNC: 7.8 G/DL (ref 5.7–8.2)
RBC # BLD AUTO: 3.91 X10(6)UL (ref 3.8–5.3)
SODIUM SERPL-SCNC: 139 MMOL/L (ref 136–145)
WBC # BLD AUTO: 7.9 X10(3) UL (ref 4–11)

## 2025-08-26 PROCEDURE — 93970 EXTREMITY STUDY: CPT | Performed by: EMERGENCY MEDICINE

## 2025-08-26 PROCEDURE — 96374 THER/PROPH/DIAG INJ IV PUSH: CPT

## 2025-08-26 PROCEDURE — 99284 EMERGENCY DEPT VISIT MOD MDM: CPT

## 2025-08-26 PROCEDURE — 80053 COMPREHEN METABOLIC PANEL: CPT | Performed by: EMERGENCY MEDICINE

## 2025-08-26 PROCEDURE — 99285 EMERGENCY DEPT VISIT HI MDM: CPT

## 2025-08-26 PROCEDURE — 96361 HYDRATE IV INFUSION ADD-ON: CPT

## 2025-08-26 PROCEDURE — 83735 ASSAY OF MAGNESIUM: CPT | Performed by: EMERGENCY MEDICINE

## 2025-08-26 PROCEDURE — 85025 COMPLETE CBC W/AUTO DIFF WBC: CPT | Performed by: EMERGENCY MEDICINE

## 2025-08-26 RX ORDER — METRONIDAZOLE 7.5 MG/G
5 GEL VAGINAL NIGHTLY
Qty: 70 G | Refills: 0 | Status: SHIPPED | OUTPATIENT
Start: 2025-08-26 | End: 2025-08-31

## 2025-08-26 RX ORDER — ONDANSETRON 2 MG/ML
4 INJECTION INTRAMUSCULAR; INTRAVENOUS ONCE
Status: COMPLETED | OUTPATIENT
Start: 2025-08-26 | End: 2025-08-26

## 2025-08-26 RX ORDER — POTASSIUM CHLORIDE 1500 MG/1
40 TABLET, EXTENDED RELEASE ORAL ONCE
Status: COMPLETED | OUTPATIENT
Start: 2025-08-26 | End: 2025-08-26

## 2025-09-01 VITALS
OXYGEN SATURATION: 95 % | BODY MASS INDEX: 29.81 KG/M2 | DIASTOLIC BLOOD PRESSURE: 80 MMHG | HEIGHT: 62 IN | WEIGHT: 162 LBS | SYSTOLIC BLOOD PRESSURE: 118 MMHG | HEART RATE: 77 BPM

## (undated) DEVICE — KIT HYSTEROSCOPIC PROC INCL INFLO OUTFLO TB

## (undated) DEVICE — DENSE TISSUE SHAVER MINI: Brand: TRUCLEAR

## (undated) DEVICE — SOLUTION IRRIG 3000ML 0.9% NACL FLX CONT

## (undated) DEVICE — GLOVE SUR 6.5 SENSICARE PIP WHT PWD F

## (undated) DEVICE — 1016 S-DRAPE IRRIG POUCH 10/BOX: Brand: STERI-DRAPE™

## (undated) DEVICE — HYSTEROSCOPY: Brand: MEDLINE INDUSTRIES, INC.

## (undated) DEVICE — MEDI-VAC NON-CONDUCTIVE SUCTION TUBING: Brand: CARDINAL HEALTH

## (undated) DEVICE — PAD,NON-ADHERENT,3X8,STERILE,LF,1/PK: Brand: MEDLINE

## (undated) DEVICE — HANDPIECE ABLAT DIA6MM ENDOMET IMPED CTRL DEV

## (undated) DEVICE — DRAPE,LITHOTOMY,STERILE: Brand: MEDLINE

## (undated) NOTE — LETTER
December 22, 2020    Carla Buchanan MD  [address] [address]     Patient: Ondina Allen   YOB: 1980   Date of Visit: 12/22/2020       Dear Dr. Uli Nagy MD:    Thank you for referring Ondina Allen to me for evaluation.  Her • Heart Disease Maternal Uncle 61   • Glaucoma Neg    • Macular degeneration Neg        Social History: Social History    Tobacco Use      Smoking status: Former Smoker        Packs/day: 0.00      Smokeless tobacco: Never Used      Tobacco comment: former Lids/Lashes Dermatochalasis, Meibomian gland dysfunction Dermatochalasis, Meibomian gland dysfunction    Conjunctiva/Sclera Normal Normal    Cornea Clear Clear    Anterior Chamber Deep and quiet Deep and quiet    Iris Normal Normal    Lens Clear Clear Patient should place wash compresses on both eyelids for 5 minutes every morning and every night. After 5 minutes of holding the warm compresses on the eyelids, patient should gently rub the eyelashes and then rinse thoroughly with warm water.      Patient

## (undated) NOTE — MR AVS SNAPSHOT
After Visit Summary   11/11/2019    Palak Frank    MRN: [id number]           Visit Information     Date & Time  11/11/2019  6:30 PM Provider  DO Tray Verma  [facility] [place] Dept.  Phone  [phone] If you receive a survey from [employer], please take a few minutes to complete it and provide feedback. We strive to deliver the best patient experience and are looking for ways to make improvements. Your feedback will help us do so.  For more infor

## (undated) NOTE — LETTER
MINOR CASE LETTER      5/7/2025        Dear Medina,    Your are having a hysteroscopy with endometrial ablation on Friday, May 23rd, 2025 @ 4PM at [facility]. Please arrive 2 hours early.     Do not eat or drink anything (including water) after midnight the night before surgery.    If your procedure is scheduled later in the day, then nothing by mouth for 6 hours before arrival to the hospital.    You are to call this office if you have any cold or flu symptoms 2 days before your scheduled surgery.    Please avoid aspirin 7 days before surgery.  Avoid Ibuprofen, Motrin, Aleve, or Naprosyn for 3 days before surgery.    You will be contacted by PreAdmission Testing (PAT) usually within the week before surgery.  They will take a short medical history and let you know if any preoperative testing is needed.    Contact your insurance company to let them know you are having a hysteroscopy with endometrial ablation done.     Please make arrangements for someone to drive you home after your surgery.    Call our office now to schedule your post-operative appointment for 2 weeks after surgery.    Please feel free to contact our office at [phone] if you have any questions regarding these instructions or your procedure.      Sincerely,    Matilde Amezquita MD  [facility], [facility] - OB/GYN  [address]  [place] [address] [phone]

## (undated) NOTE — LETTER
02/26/20        Gardenia Connors  [address]      Dear Danyell Chan records indicate that you have outstanding lab work and or testing that was ordered for you and has not yet been completed:  Orders Placed This Encounter      Albumin